# Patient Record
Sex: FEMALE | Race: WHITE | NOT HISPANIC OR LATINO | Employment: OTHER | URBAN - NONMETROPOLITAN AREA
[De-identification: names, ages, dates, MRNs, and addresses within clinical notes are randomized per-mention and may not be internally consistent; named-entity substitution may affect disease eponyms.]

---

## 2017-06-05 ENCOUNTER — APPOINTMENT (OUTPATIENT)
Dept: GENERAL RADIOLOGY | Facility: HOSPITAL | Age: 57
End: 2017-06-05

## 2017-06-05 ENCOUNTER — HOSPITAL ENCOUNTER (EMERGENCY)
Facility: HOSPITAL | Age: 57
Discharge: HOME | End: 2017-06-05
Attending: EMERGENCY MEDICINE | Admitting: EMERGENCY MEDICINE

## 2017-06-05 VITALS
DIASTOLIC BLOOD PRESSURE: 68 MMHG | RESPIRATION RATE: 16 BRPM | BODY MASS INDEX: 28.17 KG/M2 | OXYGEN SATURATION: 99 % | HEART RATE: 89 BPM | WEIGHT: 165 LBS | HEIGHT: 64 IN | SYSTOLIC BLOOD PRESSURE: 102 MMHG | TEMPERATURE: 97.6 F

## 2017-06-05 DIAGNOSIS — I47.1 PAROXYSMAL SVT (SUPRAVENTRICULAR TACHYCARDIA) (HCC): Primary | ICD-10-CM

## 2017-06-05 LAB
ALBUMIN SERPL-MCNC: 4.3 G/DL (ref 3.4–4.8)
ALBUMIN/GLOB SERPL: 1.2 G/DL (ref 1.1–1.8)
ALP SERPL-CCNC: 85 U/L (ref 38–126)
ALT SERPL W P-5'-P-CCNC: 41 U/L (ref 9–52)
ANION GAP SERPL CALCULATED.3IONS-SCNC: 12 MMOL/L (ref 5–15)
AST SERPL-CCNC: 32 U/L (ref 14–36)
BASOPHILS # BLD AUTO: 0.07 10*3/MM3 (ref 0–0.2)
BASOPHILS NFR BLD AUTO: 0.9 % (ref 0–2)
BILIRUB SERPL-MCNC: 0.4 MG/DL (ref 0.2–1.3)
BUN BLD-MCNC: 11 MG/DL (ref 7–21)
BUN/CREAT SERPL: 14.7 (ref 7–25)
CALCIUM SPEC-SCNC: 9.5 MG/DL (ref 8.4–10.2)
CHLORIDE SERPL-SCNC: 100 MMOL/L (ref 95–110)
CK MB SERPL-CCNC: 1 NG/ML (ref 0–5)
CK SERPL-CCNC: 59 U/L (ref 30–135)
CO2 SERPL-SCNC: 26 MMOL/L (ref 22–31)
CREAT BLD-MCNC: 0.75 MG/DL (ref 0.5–1)
DEPRECATED RDW RBC AUTO: 44.2 FL (ref 36.4–46.3)
EOSINOPHIL # BLD AUTO: 0.28 10*3/MM3 (ref 0–0.7)
EOSINOPHIL NFR BLD AUTO: 3.6 % (ref 0–7)
ERYTHROCYTE [DISTWIDTH] IN BLOOD BY AUTOMATED COUNT: 13.1 % (ref 11.5–14.5)
GFR SERPL CREATININE-BSD FRML MDRD: 79 ML/MIN/1.73 (ref 51–120)
GLOBULIN UR ELPH-MCNC: 3.5 GM/DL (ref 2.3–3.5)
GLUCOSE BLD-MCNC: 133 MG/DL (ref 60–100)
HCT VFR BLD AUTO: 40.3 % (ref 35–45)
HGB BLD-MCNC: 13.3 G/DL (ref 12–15.5)
IMM GRANULOCYTES # BLD: 0.03 10*3/MM3 (ref 0–0.02)
IMM GRANULOCYTES NFR BLD: 0.4 % (ref 0–0.5)
LYMPHOCYTES # BLD AUTO: 2.5 10*3/MM3 (ref 0.6–4.2)
LYMPHOCYTES NFR BLD AUTO: 32.2 % (ref 10–50)
MCH RBC QN AUTO: 30 PG (ref 26.5–34)
MCHC RBC AUTO-ENTMCNC: 33 G/DL (ref 31.4–36)
MCV RBC AUTO: 91 FL (ref 80–98)
MONOCYTES # BLD AUTO: 0.55 10*3/MM3 (ref 0–0.9)
MONOCYTES NFR BLD AUTO: 7.1 % (ref 0–12)
NEUTROPHILS # BLD AUTO: 4.33 10*3/MM3 (ref 2–8.6)
NEUTROPHILS NFR BLD AUTO: 55.8 % (ref 37–80)
NRBC BLD MANUAL-RTO: 0 /100 WBC (ref 0–0)
PLATELET # BLD AUTO: 303 10*3/MM3 (ref 150–450)
PMV BLD AUTO: 10.2 FL (ref 8–12)
POTASSIUM BLD-SCNC: 4.2 MMOL/L (ref 3.5–5.1)
PROT SERPL-MCNC: 7.8 G/DL (ref 6.3–8.6)
RBC # BLD AUTO: 4.43 10*6/MM3 (ref 3.77–5.16)
SODIUM BLD-SCNC: 138 MMOL/L (ref 137–145)
T4 FREE SERPL-MCNC: 1.06 NG/DL (ref 0.78–2.19)
TROPONIN I SERPL-MCNC: <0.012 NG/ML
TSH SERPL DL<=0.05 MIU/L-ACNC: 1.7 MIU/ML (ref 0.46–4.68)
WBC NRBC COR # BLD: 7.76 10*3/MM3 (ref 3.2–9.8)
WHOLE BLOOD HOLD SPECIMEN: NORMAL

## 2017-06-05 PROCEDURE — 99284 EMERGENCY DEPT VISIT MOD MDM: CPT

## 2017-06-05 PROCEDURE — 85025 COMPLETE CBC W/AUTO DIFF WBC: CPT | Performed by: EMERGENCY MEDICINE

## 2017-06-05 PROCEDURE — 82553 CREATINE MB FRACTION: CPT | Performed by: EMERGENCY MEDICINE

## 2017-06-05 PROCEDURE — 93010 ELECTROCARDIOGRAM REPORT: CPT | Performed by: INTERNAL MEDICINE

## 2017-06-05 PROCEDURE — 96374 THER/PROPH/DIAG INJ IV PUSH: CPT

## 2017-06-05 PROCEDURE — 80053 COMPREHEN METABOLIC PANEL: CPT | Performed by: EMERGENCY MEDICINE

## 2017-06-05 PROCEDURE — 84484 ASSAY OF TROPONIN QUANT: CPT | Performed by: EMERGENCY MEDICINE

## 2017-06-05 PROCEDURE — 84439 ASSAY OF FREE THYROXINE: CPT | Performed by: EMERGENCY MEDICINE

## 2017-06-05 PROCEDURE — 84443 ASSAY THYROID STIM HORMONE: CPT | Performed by: EMERGENCY MEDICINE

## 2017-06-05 PROCEDURE — 71020 HC CHEST PA AND LATERAL: CPT

## 2017-06-05 PROCEDURE — 82550 ASSAY OF CK (CPK): CPT | Performed by: EMERGENCY MEDICINE

## 2017-06-05 PROCEDURE — 93005 ELECTROCARDIOGRAM TRACING: CPT | Performed by: EMERGENCY MEDICINE

## 2017-06-05 RX ORDER — PRAVASTATIN SODIUM 20 MG
20 TABLET ORAL NIGHTLY
Status: ON HOLD | COMMUNITY
End: 2019-09-23 | Stop reason: ALTCHOICE

## 2017-06-05 RX ORDER — ASPIRIN 81 MG/1
81 TABLET, CHEWABLE ORAL DAILY
COMMUNITY

## 2017-06-05 RX ORDER — DILTIAZEM HYDROCHLORIDE 5 MG/ML
10 INJECTION INTRAVENOUS ONCE
Status: COMPLETED | OUTPATIENT
Start: 2017-06-05 | End: 2017-06-05

## 2017-06-05 RX ADMIN — DILTIAZEM HYDROCHLORIDE 10 MG: 5 INJECTION INTRAVENOUS at 14:31

## 2017-06-05 NOTE — ED TRIAGE NOTES
Arrival per EMS, was sitting at her desk today and felt her heart rate, nauseated and diaphoretic. This started around 1015 today. She reports that this is not new but hasn't seen anyone for her fast heart rate. On arrival to ED, she was asymptomatic, no longer exhibits symptoms and rate 108 bpm. She is calm, alert and oriented.

## 2017-06-05 NOTE — DISCHARGE INSTRUCTIONS
Followup in Dr. Morgan's clinic on Wednesday for further evaluation.  Return with any new or worsening symptoms, or any concerns.

## 2017-06-05 NOTE — ED PROVIDER NOTES
Subjective   HPI Comments: Patient presents with hx of tachycardia for 2 hours before arrival.  Patient notes she has these episodes about weekly, though sometimes monthly.  Notes episode duration from 30 minutes to 3 hours.  Today about 2 hours.  Patient noted chest tightness with the episodes.  No chest pain.  No prior cardiac workup.  EMS strip shows evidence of SVT, patient had converted by arrival.        History provided by:  Patient      Review of Systems   Constitutional: Negative.  Negative for appetite change, chills and fever.   HENT: Negative.  Negative for congestion.    Eyes: Negative.  Negative for photophobia and visual disturbance.   Respiratory: Negative.  Negative for cough, chest tightness and shortness of breath.    Cardiovascular: Negative.  Negative for chest pain and palpitations.   Gastrointestinal: Negative.  Negative for abdominal pain, constipation, diarrhea, nausea and vomiting.   Endocrine: Negative.    Genitourinary: Negative.  Negative for decreased urine volume, dysuria, flank pain and hematuria.   Musculoskeletal: Negative.  Negative for arthralgias, back pain, myalgias, neck pain and neck stiffness.   Skin: Negative.  Negative for pallor.   Neurological: Negative.  Negative for dizziness, syncope, weakness, light-headedness, numbness and headaches.   Psychiatric/Behavioral: Negative.  Negative for confusion and suicidal ideas. The patient is not nervous/anxious.    All other systems reviewed and are negative.      Past Medical History:   Diagnosis Date   • Diabetes mellitus    • Hyperlipidemia        No Known Allergies    Past Surgical History:   Procedure Laterality Date   •  SECTION         History reviewed. No pertinent family history.    Social History     Social History   • Marital status:      Spouse name: N/A   • Number of children: N/A   • Years of education: N/A     Social History Main Topics   • Smoking status: Never Smoker   • Smokeless tobacco: Never Used    • Alcohol use No   • Drug use: No   • Sexual activity: Defer     Other Topics Concern   • None     Social History Narrative   • None           Objective   Physical Exam   Constitutional: She is oriented to person, place, and time. She appears well-developed and well-nourished. No distress.   HENT:   Head: Normocephalic and atraumatic.   Nose: Nose normal.   Mouth/Throat: Oropharynx is clear and moist.   Eyes: Conjunctivae and EOM are normal. No scleral icterus.   Neck: Normal range of motion. Neck supple. No JVD present.   Cardiovascular: Regular rhythm, normal heart sounds and intact distal pulses.  Exam reveals no gallop and no friction rub.    No murmur heard.  Sinus tachycardia   Pulmonary/Chest: Effort normal. No respiratory distress. She has no wheezes. She has no rales. She exhibits no tenderness.   Abdominal: Soft. She exhibits no distension and no mass. There is no tenderness. There is no rebound and no guarding.   Musculoskeletal: Normal range of motion. She exhibits no edema, tenderness or deformity.   Lymphadenopathy:     She has no cervical adenopathy.   Neurological: She is alert and oriented to person, place, and time. No cranial nerve deficit. She exhibits normal muscle tone.   Skin: Skin is warm and dry. No rash noted. She is not diaphoretic. No erythema. No pallor.   Psychiatric: She has a normal mood and affect. Her behavior is normal. Judgment and thought content normal.   Nursing note and vitals reviewed.      Procedures         ED Course  ED Course      Labs Reviewed   COMPREHENSIVE METABOLIC PANEL - Abnormal; Notable for the following:        Result Value    Glucose 133 (*)     All other components within normal limits   CBC WITH AUTO DIFFERENTIAL - Abnormal; Notable for the following:     Immature Grans, Absolute 0.03 (*)     All other components within normal limits   CK - Normal   CK MB - Normal   TROPONIN (IN-HOUSE) - Normal   TSH - Normal   T4, FREE - Normal   CBC AND DIFFERENTIAL     Narrative:     The following orders were created for panel order CBC & Differential.  Procedure                               Abnormality         Status                     ---------                               -----------         ------                     CBC Auto Differential[[id number]]        Abnormal            Final result                 Please view results for these tests on the individual orders.   EXTRA TUBES    Narrative:     The following orders were created for panel order Extra Tubes.  Procedure                               Abnormality         Status                     ---------                               -----------         ------                     Light Blue Top[[id number]]                                   Final result                 Please view results for these tests on the individual orders.   LIGHT BLUE TOP       XR Chest 2 View   Final Result   CONCLUSION: No evidence of active disease.      Electronically signed by:  Jaycob Gil MD  6/5/2017 1:23 PM CDT   Workstation: [id number]        Patient continues with no evidence of SVT in the department, though EKG strip evidence of the same. Case discussed with Dr. Morgan for followup for possible medical vrs. Ablative therapy.  Patient and family verbalize understanding of the plan.                [facility]    Final diagnoses:   Paroxysmal SVT (supraventricular tachycardia)            Everette Trujillo MD  06/05/17 [id number]

## 2017-06-30 ENCOUNTER — OFFICE VISIT (OUTPATIENT)
Dept: CARDIOLOGY | Facility: CLINIC | Age: 57
End: 2017-06-30

## 2017-06-30 VITALS
WEIGHT: 168 LBS | HEART RATE: 74 BPM | DIASTOLIC BLOOD PRESSURE: 72 MMHG | BODY MASS INDEX: 28.68 KG/M2 | SYSTOLIC BLOOD PRESSURE: 118 MMHG | HEIGHT: 64 IN

## 2017-06-30 DIAGNOSIS — M72.2 PLANTAR FASCIITIS, LEFT: Primary | ICD-10-CM

## 2017-06-30 DIAGNOSIS — R00.2 PALPITATION: Primary | ICD-10-CM

## 2017-06-30 DIAGNOSIS — I47.1 SVT (SUPRAVENTRICULAR TACHYCARDIA) (HCC): ICD-10-CM

## 2017-06-30 DIAGNOSIS — E11.9 TYPE 2 DIABETES MELLITUS WITHOUT COMPLICATION, WITHOUT LONG-TERM CURRENT USE OF INSULIN (HCC): ICD-10-CM

## 2017-06-30 DIAGNOSIS — E78.5 HYPERLIPIDEMIA, UNSPECIFIED HYPERLIPIDEMIA TYPE: ICD-10-CM

## 2017-06-30 PROBLEM — I47.10 SVT (SUPRAVENTRICULAR TACHYCARDIA): Status: ACTIVE | Noted: 2017-06-30

## 2017-06-30 PROCEDURE — 99204 OFFICE O/P NEW MOD 45 MIN: CPT | Performed by: INTERNAL MEDICINE

## 2017-06-30 RX ORDER — DILTIAZEM HYDROCHLORIDE 120 MG/1
120 CAPSULE, COATED, EXTENDED RELEASE ORAL DAILY
Qty: 30 CAPSULE | Refills: 11 | Status: SHIPPED | OUTPATIENT
Start: 2017-06-30 | End: 2018-01-22 | Stop reason: SDUPTHER

## 2017-06-30 NOTE — PROGRESS NOTES
Mary Borges  57 y.o. female    2017  1. Palpitation    2. SVT (supraventricular tachycardia)    3. Hyperlipidemia, unspecified hyperlipidemia type    4. Type 2 diabetes mellitus without complication, without long-term current use of insulin        History of Present Illness:To evaluate for palpitation documented supraventricular tachycardia    57 years old patient with the more than 10-15 the year history of palpitation late increase in frequency and duration.  The patient admitted and evaluated at [facility] ER.  Fortunately she is (he converted to sinus rhythm.  On the rhythm strip showed documented supraventricular tachycardia with a heart rate up to 1 70 bpm.  She denies symptom of lightheaded and dizziness main symptom during tachycardia and shortness of breath.  Her other medical history included diabetes and hyperlipidemia and complaining of plantar fasciitis.  No syncope or near syncopal episode reported.          SUBJECTIVE    No Known Allergies      Past Medical History:   Diagnosis Date   • Diabetes mellitus    • Hyperlipidemia          Past Surgical History:   Procedure Laterality Date   •  SECTION           No family history on file.      Social History     Social History   • Marital status:      Spouse name: N/A   • Number of children: N/A   • Years of education: N/A     Occupational History   • Not on file.     Social History Main Topics   • Smoking status: Never Smoker   • Smokeless tobacco: Never Used   • Alcohol use No   • Drug use: No   • Sexual activity: Defer     Other Topics Concern   • Not on file     Social History Narrative         Current Outpatient Prescriptions   Medication Sig Dispense Refill   • aspirin 81 MG chewable tablet Chew 81 mg Daily.     • metFORMIN (GLUCOPHAGE) 500 MG tablet Take 500 mg by mouth 2 (Two) Times a Day With Meals.     • Phenazopyridine HCl (AZO TABS PO) Take  by mouth Daily.     • pravastatin (PRAVACHOL) 20 MG tablet Take 20 mg  "by mouth Every Night.       No current facility-administered medications for this visit.          OBJECTIVE    /72  Pulse 74  Ht 64\" (162.6 cm)  Wt 168 lb (76.2 kg)  BMI 28.84 kg/m2        Review of Systems     Constitutional:  Denies recent weight loss, weight gain, fever or chills, no change in exercise tolerance     HENT:  Denies any hearing loss, epistaxis, hoarseness, or difficulty speaking.     Eyes: No blurring     Respiratory:  Denies dyspnea with exertion,no cough, wheezing, or hemoptysis.     Cardiovascular: See H&P    Gastrointestinal:  Denies change in bowel habits, dyspepsia, ulcer disease, hematochezia, or melena.     Endocrine: Negative for cold intolerance, heat intolerance, polydipsia, polyphagia and polyuria. Denies any history of weight change, heat/cold intolerance, polydipsia, polyuria     Genitourinary: Negative.      Musculoskeletal: Denies any history of arthritic symptoms or back problems     Skin:  Denies any change in hair or nails, rashes, or skin lesions.     Allergic/Immunologic: Negative.  Negative for environmental allergies, food allergies and immunocompromised state.     Neurological:  Denies any history of recurrent headaches, strokes, TIA, or seizure disorder.     Hematological: Denies any food allergies, seasonal allergies, bleeding disorders, or lymphadenopathy.     Psychiatric/Behavioral: Denies any history of depression, substance abuse, or change in cognitive function.         Physical Exam     Constitutional: Cooperative, alert and oriented, well-developed, well-nourished, in no acute distress.     HENT:   Head: Normocephalic, normal hair patterns, no masses or tenderness.  Ears, Nose, and Throat: No gross abnormalities. No pallor or cyanosis. Dentition good.   Eyes: EOMS intact, PERRL, conjunctivae and lids unremarkable. Fundoscopic exam and visual fields not performed.   Neck: No palpable masses or adenopathy, no thyromegaly, no JVD, carotid pulses are full and " equal bilaterally and without  Bruits.     Cardiovascular: Regular rhythm, S1 and S2 normal, no S3 or S4. Apical impulse not displaced. No murmurs, gallops, or rubs detected.     Pulmonary/Chest: Chest: normal symmetry, no tenderness to palpation, normal respiratory excursion, no intercostal retraction, no use of accessory muscles.            Pulmonary: Normal breath sounds. No rales or ronchi.    Abdominal: Abdomen soft, bowel sounds normoactive, no masses, no hepatosplenomegaly, non-tender, no bruits.     Musculoskeletal: No deformities, clubbing, cyanosis, erythema, or edema observed. There are no spinal abnormalities noted. Normal muscle strength and tone. Pulses full and equal in all extremities, no bruits auscultated.     Neurological: No gross motor or sensory deficits noted, affect appropriate, oriented to time, person, place.     Skin: Warm and dry to the touch, no apparent skin lesions or masses noted.     Psychiatric: She has a normal mood and affect. Her behavior is normal. Judgment and thought content normal.         Procedures      Lab Results   Component Value Date    WBC 7.76 06/05/2017    HGB 13.3 06/05/2017    HCT 40.3 06/05/2017    MCV 91.0 06/05/2017     06/05/2017     Lab Results   Component Value Date    GLUCOSE 133 (H) 06/05/2017    BUN 11 06/05/2017    CREATININE 0.75 06/05/2017    EGFRIFNONA 79 06/05/2017    BCR 14.7 06/05/2017    CO2 26.0 06/05/2017    CALCIUM 9.5 06/05/2017    ALBUMIN 4.30 06/05/2017    LABIL2 1.2 06/05/2017    AST 32 06/05/2017    ALT 41 06/05/2017     No results found for: CHOL  No results found for: TRIG  No results found for: HDL  No results found for: LDLCALC  No results found for: LDL  No results found for: HDLLDLRATIO  No components found for: CHOLHDL  No results found for: HGBA1C  Lab Results   Component Value Date    TSH 1.700 06/05/2017           ASSESSMENT AND PLAN  #1 simple ventricle tachycardia #2 palpitation #3 hyperlipidemia #4 type 2  diabetes    Clinically there is no sign of any cardiac decompensation based on the clinical history physical finding.  Multiple different options discussed with the patient and the family regarding the management of supraventricular tachycardia.  Option #1 simple medical management #2 and arrhythmia medication #3 EP study and ablations.  Pros and cons of each option discussed with the patient and the family.  They opted for simple medical management.  We'll start the patient Cardizem at a dose of 120 mg prescription refill was given to the patient.  We'll arrange an echocardiographic study to assess the left and a systolic function.  We will see her back in 6 month R depends on patient clinical conditions.  She will continue metformin with history diabetes and pravastatin for management of hyperlipidemia.  will patient to podiatrist.    Diagnoses and all orders for this visit:    Palpitation    SVT (supraventricular tachycardia)    Hyperlipidemia, unspecified hyperlipidemia type    Type 2 diabetes mellitus without complication, without long-term current use of insulin        Sony Morgan MD  6/30/2017  11:34 AM

## 2017-07-14 ENCOUNTER — OFFICE VISIT (OUTPATIENT)
Dept: PODIATRY | Facility: CLINIC | Age: 57
End: 2017-07-14

## 2017-07-14 VITALS — HEIGHT: 64 IN | BODY MASS INDEX: 28.68 KG/M2 | WEIGHT: 168 LBS

## 2017-07-14 DIAGNOSIS — M79.672 LEFT FOOT PAIN: Primary | ICD-10-CM

## 2017-07-14 DIAGNOSIS — M72.2 PLANTAR FASCIITIS: ICD-10-CM

## 2017-07-14 PROCEDURE — 99203 OFFICE O/P NEW LOW 30 MIN: CPT | Performed by: PODIATRIST

## 2017-07-14 RX ORDER — MELOXICAM 7.5 MG/1
7.5 TABLET ORAL DAILY
Qty: 30 TABLET | Refills: 0 | Status: SHIPPED | OUTPATIENT
Start: 2017-07-14 | End: 2019-02-05

## 2017-07-14 NOTE — PROGRESS NOTES
"Mary Borges  1960  57 y.o. female  Patient presents today with a complaint of left heel pain.  2017  Chief Complaint   Patient presents with   • Left Foot - Pain           History of Present Illness    57-year-old female presents to clinic today with chief complaint of left foot pain.  Pain is located to the bottom of her heel.  Pain has been present for approximately 3 months.  She denies any injuries to the foot.  States the pain is worse with the first step in the morning or after periods of rest.  Currently she rates the pain as an 8 out of 10 and describes it as sharp.  She has tried nothing to treat the pain.  She has no other pedal complaints.        Past Medical History:   Diagnosis Date   • Diabetes mellitus    • Hyperlipidemia          Past Surgical History:   Procedure Laterality Date   •  SECTION           No family history on file.      Social History     Social History   • Marital status:      Spouse name: N/A   • Number of children: N/A   • Years of education: N/A     Occupational History   • Not on file.     Social History Main Topics   • Smoking status: Never Smoker   • Smokeless tobacco: Never Used   • Alcohol use No   • Drug use: No   • Sexual activity: Defer     Other Topics Concern   • Not on file     Social History Narrative         Current Outpatient Prescriptions   Medication Sig Dispense Refill   • aspirin 81 MG chewable tablet Chew 81 mg Daily.     • diltiaZEM CD (CARDIZEM CD) 120 MG 24 hr capsule Take 1 capsule by mouth Daily. 30 capsule 11   • metFORMIN (GLUCOPHAGE) 500 MG tablet Take 500 mg by mouth 2 (Two) Times a Day With Meals.     • Phenazopyridine HCl (AZO TABS PO) Take  by mouth Daily.     • pravastatin (PRAVACHOL) 20 MG tablet Take 20 mg by mouth Every Night.     • meloxicam (MOBIC) 7.5 MG tablet Take 1 tablet by mouth Daily. Take once daily. 30 tablet 0     No current facility-administered medications for this visit.          OBJECTIVE    Ht 64\" (162.6 " cm)  Wt 168 lb (76.2 kg)  BMI 28.84 kg/m2      Review of Systems   Constitutional: Negative for chills and fever.   Cardiovascular: Negative for chest pain.   Gastrointestinal: Negative for constipation, diarrhea, nausea and vomiting.   Skin: Negative for wound.   Musculoskeletal:Left foot pain      Constitutional: well developed, well nourished    HEENT: Normocephalic and atraumatic, normal hearing    Respiratory: Non labored respirations noted    Cardiovascular:    DP/PT pulses palpable    CFT brisk  to all digits  Skin temp is warm to warm from proximal tibia to distal digits  Pedal hair growth present.   No erythema or edema noted     Musculoskeletal:  Muscle strength is 5/5 for all muscle groups tested   ROM of the 1st MTP is full without pain or crepitus  ROM of the MTJ is full without pain or crepitus    ROM of the STJ is full without pain or crepitus    ROM of the ankle joint is full without pain or crepitus    Pain on palpation to the medial tubercle of the left calcaneus.  Negative lateral squeeze test.  Rectus foot type     Dermatological:   Nails 1-5 are within normal limits for length and thickness    Skin is warm, dry and intact    Webspaces 1-4 bilateral are clean, dry and intact.   No subcutaneous nodules or masses noted    No open wounds noted     Neurological:   Protective sensation intact    Sensation intact to light touch    DTR intact    Psychiatric: A&O x 3 with normal mood and affect. NAD.     Radiographs: 3 views the left foot were obtained today.  No acute osseous abnormalities are noted.  Calcaneal spurring noted.        Procedures        ASSESSMENT AND PLAN    Mary was seen today for pain.    Diagnoses and all orders for this visit:    Left foot pain  -     XR Foot 3+ View Left    Plantar fasciitis    Other orders  -     meloxicam (MOBIC) 7.5 MG tablet; Take 1 tablet by mouth Daily. Take once daily.    - Comprehensive foot and ankle exam performed  - X-rays taken and reviewed  - Rx for  mobic  - Patient advised to perform stretching exercises, icing and to make appropriate shoe gear changes to include wearing athletic type shoes with supportive insoles.  No bare foot walking.  Patient also given written instructions on how to correctly perform the stretching of the Achilles tendon/calf stretches, and the heel spur/plantar fasciitis regimen.  - Recommended over-the-counter insole such as power steps to properly support the arch in order to alleviate the tension in stress on the plantar fascia associated with normal daily walking. Patient was educated on the break-in period for new orthotics.  - Patient is in agreement with the current treatment plan.  All  questions were answered to their satisfaction.  - RTC in 4 weeks            This document has been electronically signed by Adan Jackson DPM on July 14, 2017 10:47 AM     7/14/2017  10:47 AM    EMR Dragon/Transcription disclaimer:   Much of this encounter note is an electronic transcription/translation of spoken language to printed text. The electronic translation of spoken language may permit erroneous, or at times, nonsensical words or phrases to be inadvertently transcribed; Although I have reviewed the note for such errors, some may still exist.

## 2017-08-11 ENCOUNTER — OFFICE VISIT (OUTPATIENT)
Dept: PODIATRY | Facility: CLINIC | Age: 57
End: 2017-08-11

## 2017-08-11 VITALS — BODY MASS INDEX: 28.68 KG/M2 | HEIGHT: 64 IN | WEIGHT: 168 LBS

## 2017-08-11 DIAGNOSIS — M72.2 PLANTAR FASCIITIS: Primary | ICD-10-CM

## 2017-08-11 PROCEDURE — 99213 OFFICE O/P EST LOW 20 MIN: CPT | Performed by: PODIATRIST

## 2017-08-11 NOTE — PROGRESS NOTES
"Mary Borges  1960  57 y.o. female  Patient presents today for a follow-up of left heel pain.    2017  Chief Complaint   Patient presents with   • Left Foot - Pain, Follow-up           History of Present Illness    Patient presents to me for follow-up of her left foot plantar fasciitis.  Her pain has improved from last visit.  She currently rates it as a 4 out of 10.  She is only stretching occasionally.  She is wearing her arch supports.  She has no new pedal complaints.      Past Medical History:   Diagnosis Date   • Diabetes mellitus    • Hyperlipidemia          Past Surgical History:   Procedure Laterality Date   •  SECTION           No family history on file.      Social History     Social History   • Marital status:      Spouse name: N/A   • Number of children: N/A   • Years of education: N/A     Occupational History   • Not on file.     Social History Main Topics   • Smoking status: Never Smoker   • Smokeless tobacco: Never Used   • Alcohol use No   • Drug use: No   • Sexual activity: Defer     Other Topics Concern   • Not on file     Social History Narrative         Current Outpatient Prescriptions   Medication Sig Dispense Refill   • aspirin 81 MG chewable tablet Chew 81 mg Daily.     • diltiaZEM CD (CARDIZEM CD) 120 MG 24 hr capsule Take 1 capsule by mouth Daily. 30 capsule 11   • meloxicam (MOBIC) 7.5 MG tablet Take 1 tablet by mouth Daily. Take once daily. 30 tablet 0   • metFORMIN (GLUCOPHAGE) 500 MG tablet Take 500 mg by mouth 2 (Two) Times a Day With Meals.     • Phenazopyridine HCl (AZO TABS PO) Take  by mouth Daily.     • pravastatin (PRAVACHOL) 20 MG tablet Take 20 mg by mouth Every Night.       No current facility-administered medications for this visit.          OBJECTIVE    Ht 64\" (162.6 cm)  Wt 168 lb (76.2 kg)  BMI 28.84 kg/m2      Review of Systems   Constitutional: Negative for chills and fever.   Cardiovascular: Negative for chest pain.   Gastrointestinal: " Negative for constipation, diarrhea, nausea and vomiting.   Skin: Negative for wound.   Musculoskeletal:Left foot pain      Constitutional: well developed, well nourished    HEENT: Normocephalic and atraumatic, normal hearing    Respiratory: Non labored respirations noted    Cardiovascular:    DP/PT pulses palpable    CFT brisk  to all digits  Skin temp is warm to warm from proximal tibia to distal digits  Pedal hair growth present.   No erythema or edema noted     Musculoskeletal:  Muscle strength is 5/5 for all muscle groups tested   ROM of the 1st MTP is full without pain or crepitus  ROM of the MTJ is full without pain or crepitus    ROM of the STJ is full without pain or crepitus    ROM of the ankle joint is full without pain or crepitus    Pain on palpation to the medial tubercle of the left calcaneus.  Negative lateral squeeze test.  Rectus foot type     Dermatological:   Nails 1-5 are within normal limits for length and thickness    Skin is warm, dry and intact    Webspaces 1-4 bilateral are clean, dry and intact.   No subcutaneous nodules or masses noted    No open wounds noted     Neurological:   Protective sensation intact    Sensation intact to light touch    DTR intact    Psychiatric: A&O x 3 with normal mood and affect. NAD.       Procedures        ASSESSMENT AND PLAN    Mary was seen today for pain and follow-up.    Diagnoses and all orders for this visit:    Plantar fasciitis    - Continue stretching, icing and are supports  - Recommended steroid injection to left heel however patient refuses at this time  - Advised patient to obtain a plantar fascial night splint  - Patient is in agreement with the current treatment plan.  All  questions were answered to their satisfaction.  - RTC in 2 months            This document has been electronically signed by Adan Jackson DPM on August 11, 2017 3:38 PM     8/11/2017  3:38 PM    EMR Dragon/Transcription disclaimer:   Much of this encounter note is an  electronic transcription/translation of spoken language to printed text. The electronic translation of spoken language may permit erroneous, or at times, nonsensical words or phrases to be inadvertently transcribed; Although I have reviewed the note for such errors, some may still exist.

## 2017-10-20 ENCOUNTER — OFFICE VISIT (OUTPATIENT)
Dept: PODIATRY | Facility: CLINIC | Age: 57
End: 2017-10-20

## 2017-10-20 VITALS — WEIGHT: 165 LBS | HEIGHT: 64 IN | BODY MASS INDEX: 28.17 KG/M2

## 2017-10-20 DIAGNOSIS — M72.2 PLANTAR FASCIITIS: Primary | ICD-10-CM

## 2017-10-20 PROCEDURE — 99213 OFFICE O/P EST LOW 20 MIN: CPT | Performed by: PODIATRIST

## 2017-10-20 NOTE — PROGRESS NOTES
Mary Borges  1960  57 y.o. female     Patient presents today for a follow-up of her left heel pain.    10/20/2017  Chief Complaint   Patient presents with   • Left Foot - Follow-up   • Plantar Fasciitis           History of Present Illness    Patient presents to me for follow-up of her left foot plantar fasciitis.   Her heel pain continues to improve.  She has obtained a night splint and is using regularly.  She rates her pain as a 2 out of 10 today.  She is still refusing steroid injection.  She has no new pedal complaints.    Past Medical History:   Diagnosis Date   • Diabetes mellitus    • Hyperlipidemia    • Plantar fasciitis          Past Surgical History:   Procedure Laterality Date   •  SECTION           Family History   Problem Relation Age of Onset   • Cancer Mother    • Heart disease Father    • Diabetes Father    • Diabetes Sister    • Cancer Maternal Grandmother          Social History     Social History   • Marital status:      Spouse name: N/A   • Number of children: N/A   • Years of education: N/A     Occupational History   • Not on file.     Social History Main Topics   • Smoking status: Never Smoker   • Smokeless tobacco: Never Used   • Alcohol use No   • Drug use: No   • Sexual activity: Defer     Other Topics Concern   • Not on file     Social History Narrative         Current Outpatient Prescriptions   Medication Sig Dispense Refill   • aspirin 81 MG chewable tablet Chew 81 mg Daily.     • diltiaZEM CD (CARDIZEM CD) 120 MG 24 hr capsule Take 1 capsule by mouth Daily. 30 capsule 11   • meloxicam (MOBIC) 7.5 MG tablet Take 1 tablet by mouth Daily. Take once daily. 30 tablet 0   • metFORMIN (GLUCOPHAGE) 500 MG tablet Take 500 mg by mouth 2 (Two) Times a Day With Meals.     • Phenazopyridine HCl (AZO TABS PO) Take  by mouth Daily.     • pravastatin (PRAVACHOL) 20 MG tablet Take 20 mg by mouth Every Night.       No current facility-administered medications for this visit.   "        OBJECTIVE    Ht 64\" (162.6 cm)  Wt 165 lb (74.8 kg)  BMI 28.32 kg/m2      Review of Systems   Constitutional: Negative for chills and fever.   Cardiovascular: Negative for chest pain.   Gastrointestinal: Negative for constipation, diarrhea, nausea and vomiting.   Skin: Negative for wound.   Musculoskeletal:Left foot pain      Constitutional: well developed, well nourished    HEENT: Normocephalic and atraumatic, normal hearing    Respiratory: Non labored respirations noted    Cardiovascular:    DP/PT pulses palpable    No erythema or edema noted     Musculoskeletal:  Muscle strength is 5/5 for all muscle groups tested    Mild Pain on palpation to the medial tubercle of the left calcaneus.  Negative lateral squeeze test.  Rectus foot type     Dermatological:   Nails 1-5 are within normal limits for length and thickness    No open wounds noted     Neurological:   Sensation intact to light touch      Psychiatric: A&O x 3 with normal mood and affect. NAD.       Procedures        ASSESSMENT AND PLAN    Mary was seen today for plantar fasciitis and follow-up.    Diagnoses and all orders for this visit:    Plantar fasciitis    - Continue stretching, icing and are supports  - All  questions were answered to their satisfaction.  - RTC if symptoms worsen or fail to improve            This document has been electronically signed by Adan Jackson DPM on October 20, 2017 9:09 AM     10/20/2017  9:09 AM    "

## 2017-12-26 RX ORDER — DILTIAZEM HYDROCHLORIDE 120 MG/1
TABLET, FILM COATED ORAL
Qty: 30 TABLET | Refills: 6 | Status: SHIPPED | OUTPATIENT
Start: 2017-12-26 | End: 2018-07-24 | Stop reason: SDUPTHER

## 2018-01-22 ENCOUNTER — OFFICE VISIT (OUTPATIENT)
Dept: CARDIOLOGY | Facility: CLINIC | Age: 58
End: 2018-01-22

## 2018-01-22 VITALS
SYSTOLIC BLOOD PRESSURE: 128 MMHG | DIASTOLIC BLOOD PRESSURE: 70 MMHG | HEART RATE: 75 BPM | HEIGHT: 64 IN | WEIGHT: 166 LBS | BODY MASS INDEX: 28.34 KG/M2

## 2018-01-22 DIAGNOSIS — R00.2 PALPITATION: ICD-10-CM

## 2018-01-22 DIAGNOSIS — I47.1 SVT (SUPRAVENTRICULAR TACHYCARDIA) (HCC): Primary | ICD-10-CM

## 2018-01-22 DIAGNOSIS — E78.5 HYPERLIPIDEMIA, UNSPECIFIED HYPERLIPIDEMIA TYPE: ICD-10-CM

## 2018-01-22 DIAGNOSIS — E11.9 TYPE 2 DIABETES MELLITUS WITHOUT COMPLICATION, WITHOUT LONG-TERM CURRENT USE OF INSULIN (HCC): ICD-10-CM

## 2018-01-22 PROCEDURE — 99213 OFFICE O/P EST LOW 20 MIN: CPT | Performed by: INTERNAL MEDICINE

## 2018-01-22 NOTE — PROGRESS NOTES
Mary Borges  58 y.o. female    2018  1. SVT (supraventricular tachycardia)    2. Palpitation    3. Hyperlipidemia, unspecified hyperlipidemia type    4. Type 2 diabetes mellitus without complication, without long-term current use of insulin        History of Present Illness:Routine follow-up    58 years old patient with the more than 10-15 the year history of palpitation lately increase in frequency and duration.  The patient admitted and evaluated at [facility].  Fortunately she is (he converted to sinus rhythm.  On the rhythm strip showed documented supraventricular tachycardia with a heart rate up to 1 70 bpm.  She denies symptom of lightheaded and dizziness main symptom during tachycardia and shortness of breath.  Her other medical history included diabetes and hyperlipidemia and complaining of plantar fasciitis. Patient had episode of palpitation and tachycardia and early part of 2017.  Patient want to continue present medication and doesn't want to consider EP study and ablation at this stage.        SUBJECTIVE    No Known Allergies      Past Medical History:   Diagnosis Date   • Diabetes mellitus    • Hyperlipidemia    • Plantar fasciitis          Past Surgical History:   Procedure Laterality Date   •  SECTION           Family History   Problem Relation Age of Onset   • Cancer Mother    • Heart disease Father    • Diabetes Father    • Diabetes Sister    • Cancer Maternal Grandmother          Social History     Social History   • Marital status:      Spouse name: N/A   • Number of children: N/A   • Years of education: N/A     Occupational History   • Not on file.     Social History Main Topics   • Smoking status: Never Smoker   • Smokeless tobacco: Never Used   • Alcohol use No   • Drug use: No   • Sexual activity: Defer     Other Topics Concern   • Not on file     Social History Narrative         Current Outpatient Prescriptions   Medication Sig Dispense Refill  "  • aspirin 81 MG chewable tablet Chew 81 mg Daily.     • diltiaZEM (CARDIZEM) 120 MG tablet TAKE 1 TABLET DAILY. 30 tablet 6   • meloxicam (MOBIC) 7.5 MG tablet Take 1 tablet by mouth Daily. Take once daily. 30 tablet 0   • metFORMIN (GLUCOPHAGE) 500 MG tablet Take 500 mg by mouth 2 (Two) Times a Day With Meals.     • Phenazopyridine HCl (AZO TABS PO) Take  by mouth Daily.     • pravastatin (PRAVACHOL) 20 MG tablet Take 20 mg by mouth Every Night.       No current facility-administered medications for this visit.          OBJECTIVE    /70  Pulse 75  Ht 162.6 cm (64.02\")  Wt 75.3 kg (166 lb)  BMI 28.48 kg/m2        Review of Systems     Constitutional:  Denies recent weight loss, weight gain, fever or chills, no change in exercise tolerance     HENT:  Denies any hearing loss, epistaxis, hoarseness, or difficulty speaking.     Eyes: Wears eyeglasses or contact lenses     Respiratory:  Denies dyspnea with exertion,no cough, wheezing, or hemoptysis.     Cardiovascular: Negative for palpations, chest pain, orthopnea, PND, peripheral edema, syncope, or claudication.     Gastrointestinal:  Denies change in bowel habits, dyspepsia, ulcer disease, hematochezia, or melena.     Endocrine: Negative for cold intolerance, heat intolerance, polydipsia, polyphagia and polyuria. Denies any history of weight change, heat/cold intolerance, polydipsia, polyuria     Genitourinary: Negative.      Musculoskeletal: Denies any history of arthritic symptoms or back problems     Skin:  Denies any change in hair or nails, rashes, or skin lesions.     Allergic/Immunologic: Negative.  Negative for environmental allergies, food allergies and immunocompromised state.     Neurological:  Denies any history of recurrent headaches, strokes, TIA, or seizure disorder.     Hematological: Denies any food allergies, seasonal allergies, bleeding disorders, or lymphadenopathy.     Psychiatric/Behavioral: Denies any history of depression, substance " abuse, or change in cognitive function.         Physical Exam     Constitutional: Cooperative, alert and oriented, well-developed, well-nourished, in no acute distress.     HENT:   Head: Normocephalic, normal hair patterns, no masses or tenderness.  Ears, Nose, and Throat: No gross abnormalities. No pallor or cyanosis. Dentition good.   Eyes: EOMS intact, PERRL, conjunctivae and lids unremarkable. Fundoscopic exam and visual fields not performed.   Neck: No palpable masses or adenopathy, no thyromegaly, no JVD, carotid pulses are full and equal bilaterally and without  Bruits.     Cardiovascular: Regular rhythm, S1 and S2 normal, no S3 or S4. Apical impulse not displaced. No murmurs, gallops, or rubs detected.     Pulmonary/Chest: Chest: normal symmetry, no tenderness to palpation, normal respiratory excursion, no intercostal retraction, no use of accessory muscles.            Pulmonary: Normal breath sounds. No rales or ronchi.    Abdominal: Abdomen soft, bowel sounds normoactive, no masses, no hepatosplenomegaly, non-tender, no bruits.     Musculoskeletal: No deformities, clubbing, cyanosis, erythema, or edema observed. There are no spinal abnormalities noted. Normal muscle strength and tone. Pulses full and equal in all extremities, no bruits auscultated.     Neurological: No gross motor or sensory deficits noted, affect appropriate, oriented to time, person, place.     Skin: Warm and dry to the touch, no apparent skin lesions or masses noted.     Psychiatric: She has a normal mood and affect. Her behavior is normal. Judgment and thought content normal.         Procedures      Lab Results   Component Value Date    WBC 7.76 06/05/2017    HGB 13.3 06/05/2017    HCT 40.3 06/05/2017    MCV 91.0 06/05/2017     06/05/2017     Lab Results   Component Value Date    GLUCOSE 133 (H) 06/05/2017    BUN 11 06/05/2017    CREATININE 0.75 06/05/2017    EGFRIFNONA 79 06/05/2017    BCR 14.7 06/05/2017    CO2 26.0 06/05/2017     CALCIUM 9.5 06/05/2017    ALBUMIN 4.30 06/05/2017    LABIL2 1.2 06/05/2017    AST 32 06/05/2017    ALT 41 06/05/2017     No results found for: CHOL  No results found for: TRIG  No results found for: HDL  No results found for: LDLCALC  No results found for: LDL  No results found for: HDLLDLRATIO  No components found for: CHOLHDL  No results found for: HGBA1C  Lab Results   Component Value Date    TSH 1.700 06/05/2017           ASSESSMENT AND PLAN    #1 simple ventricle tachycardia #2 palpitation #3 hyperlipidemia #4 type 2 diabetes     Clinically there is no sign of any cardiac decompensation based on the clinical history physical finding. Previously, Multiple different options discussed with the patient and the family regarding the management of supraventricular tachycardia.  Option #1 simple medical management #2 and arrhythmia medication #3 EP study and ablations.  Pros and cons of each option discussed with the patient and the family.  They opted for simple medical management. Started Cardizem at a dose of 120 mg. .  We will see her back in 6 month R depends on patient clinical conditions.  She will continue metformin with history diabetes and pravastatin for management of hyperlipidemia.  Mary was seen today for follow-up.    Diagnoses and all orders for this visit:    SVT (supraventricular tachycardia)    Palpitation    Hyperlipidemia, unspecified hyperlipidemia type    Type 2 diabetes mellitus without complication, without long-term current use of insulin        Sony Morgan MD  1/22/2018  10:23 AM

## 2018-07-24 RX ORDER — DILTIAZEM HYDROCHLORIDE 120 MG/1
TABLET, FILM COATED ORAL
Qty: 30 TABLET | Refills: 0 | Status: SHIPPED | OUTPATIENT
Start: 2018-07-24 | End: 2018-08-23 | Stop reason: SDUPTHER

## 2018-07-25 ENCOUNTER — OFFICE VISIT (OUTPATIENT)
Dept: CARDIOLOGY | Facility: CLINIC | Age: 58
End: 2018-07-25

## 2018-07-25 VITALS
WEIGHT: 166 LBS | DIASTOLIC BLOOD PRESSURE: 60 MMHG | SYSTOLIC BLOOD PRESSURE: 102 MMHG | HEIGHT: 64 IN | OXYGEN SATURATION: 96 % | HEART RATE: 75 BPM | BODY MASS INDEX: 28.34 KG/M2

## 2018-07-25 DIAGNOSIS — E78.5 HYPERLIPIDEMIA, UNSPECIFIED HYPERLIPIDEMIA TYPE: ICD-10-CM

## 2018-07-25 DIAGNOSIS — R00.2 PALPITATION: ICD-10-CM

## 2018-07-25 DIAGNOSIS — I47.1 SVT (SUPRAVENTRICULAR TACHYCARDIA) (HCC): Primary | ICD-10-CM

## 2018-07-25 DIAGNOSIS — E11.9 TYPE 2 DIABETES MELLITUS WITHOUT COMPLICATION, WITHOUT LONG-TERM CURRENT USE OF INSULIN (HCC): ICD-10-CM

## 2018-07-25 PROCEDURE — 99213 OFFICE O/P EST LOW 20 MIN: CPT | Performed by: INTERNAL MEDICINE

## 2018-07-25 NOTE — PROGRESS NOTES
Mary Borges  58 y.o. female    2018  1. SVT (supraventricular tachycardia) (CMS/[facility])    2. Palpitation    3. Hyperlipidemia, unspecified hyperlipidemia type    4. Type 2 diabetes mellitus without complication, without long-term current use of insulin (CMS/[facility])        History of Present Illness    58 years old patient with the more than 10-15 the year history of palpitation .  The patient admitted and evaluated at [facility] ER.  Fortunately she i converted to sinus rhythm.  On the rhythm strip showed documented supraventricular tachycardia with a heart rate up to 1 70 bpm.  She denies symptom of lightheaded and dizziness main symptom during tachycardia and shortness of breath.  Her other medical history included diabetes and hyperlipidemia and complaining of plantar fasciitis. Patient had episode of palpitation and tachycardia and early part of 2017.  Patient want to continue present medication and doesn't want to consider EP study and ablation at this stage.Her only complaint is a week and fatigability of the thigh muscle.  She is on statin Mevacor.  Asked the patient to hold the statin and see if there is any improvement right        SUBJECTIVE    No Known Allergies      Past Medical History:   Diagnosis Date   • Diabetes mellitus (CMS/HCC)    • Hyperlipidemia    • Plantar fasciitis          Past Surgical History:   Procedure Laterality Date   •  SECTION           Family History   Problem Relation Age of Onset   • Cancer Mother    • Heart disease Father    • Diabetes Father    • Diabetes Sister    • Cancer Maternal Grandmother          Social History     Social History   • Marital status:      Spouse name: N/A   • Number of children: N/A   • Years of education: N/A     Occupational History   • Not on file.     Social History Main Topics   • Smoking status: Never Smoker   • Smokeless tobacco: Never Used   • Alcohol use No   • Drug use: No   • Sexual activity: Defer  "    Other Topics Concern   • Not on file     Social History Narrative   • No narrative on file         Current Outpatient Prescriptions   Medication Sig Dispense Refill   • aspirin 81 MG chewable tablet Chew 81 mg Daily.     • diltiaZEM (CARDIZEM) 120 MG tablet TAKE 1 TABLET DAILY. 30 tablet 0   • meloxicam (MOBIC) 7.5 MG tablet Take 1 tablet by mouth Daily. Take once daily. 30 tablet 0   • metFORMIN (GLUCOPHAGE) 500 MG tablet Take 500 mg by mouth 2 (Two) Times a Day With Meals.     • Phenazopyridine HCl (AZO TABS PO) Take  by mouth Daily.     • pravastatin (PRAVACHOL) 20 MG tablet Take 20 mg by mouth Every Night.       No current facility-administered medications for this visit.          OBJECTIVE    /60   Pulse 75   Ht 162.6 cm (64.02\")   Wt 75.3 kg (166 lb)   SpO2 96%   BMI 28.48 kg/m²         Review of Systems     Constitutional:  Denies recent weight loss, weight gain, fever or chills, no change in exercise tolerance     HENT:  Denies any hearing loss, epistaxis, hoarseness, or difficulty speaking.     Eyes: Wears eyeglasses or contact lenses     Respiratory:  Denies dyspnea with exertion,no cough, wheezing, or hemoptysis.     Cardiovascular: Negative for palpations, chest pain, orthopnea, PND, peripheral edema, syncope, or claudication.     Gastrointestinal:  Denies change in bowel habits, dyspepsia, ulcer disease, hematochezia, or melena.     Endocrine: Negative for cold intolerance, heat intolerance, polydipsia, polyphagia and polyuria. Denies any history of weight change, heat/cold intolerance, polydipsia, polyuria     Genitourinary: Negative.      Musculoskeletal: Denies any history of arthritic symptoms or back problems     Skin:  Denies any change in hair or nails, rashes, or skin lesions.     Allergic/Immunologic: Negative.  Negative for environmental allergies, food allergies and immunocompromised state.     Neurological:  Denies any history of recurrent headaches, strokes, TIA, or seizure " disorder.     Hematological: Denies any food allergies, seasonal allergies, bleeding disorders, or lymphadenopathy.     Psychiatric/Behavioral: Denies any history of depression, substance abuse, or change in cognitive function.         Physical Exam     Constitutional: Cooperative, alert and oriented, well-developed, well-nourished, in no acute distress.     HENT:   Head: Normocephalic, normal hair patterns, no masses or tenderness.  Ears, Nose, and Throat: No gross abnormalities. No pallor or cyanosis. Dentition good.   Eyes: EOMS intact, PERRL, conjunctivae and lids unremarkable. Fundoscopic exam and visual fields not performed.   Neck: No palpable masses or adenopathy, no thyromegaly, no JVD, carotid pulses are full and equal bilaterally and without  Bruits.     Cardiovascular: Regular rhythm, S1 and S2 normal, no S3 or S4. Apical impulse not displaced. No murmurs, gallops, or rubs detected.     Pulmonary/Chest: Chest: normal symmetry, no tenderness to palpation, normal respiratory excursion, no intercostal retraction, no use of accessory muscles.            Pulmonary: Normal breath sounds. No rales or ronchi.    Abdominal: Abdomen soft, bowel sounds normoactive, no masses, no hepatosplenomegaly, non-tender, no bruits.     Musculoskeletal: No deformities, clubbing, cyanosis, erythema, or edema observed. There are no spinal abnormalities noted. Normal muscle strength and tone. Pulses full and equal in all extremities, no bruits auscultated.     Neurological: No gross motor or sensory deficits noted, affect appropriate, oriented to time, person, place.     Skin: Warm and dry to the touch, no apparent skin lesions or masses noted.     Psychiatric: She has a normal mood and affect. Her behavior is normal. Judgment and thought content normal.         Procedures      Lab Results   Component Value Date    WBC 7.76 06/05/2017    HGB 13.3 06/05/2017    HCT 40.3 06/05/2017    MCV 91.0 06/05/2017     06/05/2017      Lab Results   Component Value Date    GLUCOSE 133 (H) 06/05/2017    BUN 11 06/05/2017    CREATININE 0.75 06/05/2017    EGFRIFNONA 79 06/05/2017    BCR 14.7 06/05/2017    CO2 26.0 06/05/2017    CALCIUM 9.5 06/05/2017    ALBUMIN 4.30 06/05/2017    AST 32 06/05/2017    ALT 41 06/05/2017     No results found for: CHOL  No results found for: TRIG  No results found for: HDL  No components found for: LDLCALC  No results found for: LDL  No results found for: HDLLDLRATIO  No components found for: CHOLHDL  No results found for: HGBA1C  Lab Results   Component Value Date    TSH 1.700 06/05/2017           ASSESSMENT AND PLAN  #1 Supra ventricle tachycardia #2 palpitation #3 hyperlipidemia #4 type 2 diabetes     Clinically there is no sign of any cardiac decompensation based on the clinical history physical finding. Previously, Multiple different options discussed with the patient and the family regarding the management of supraventricular tachycardia.  Option #1 simple medical management #2 and arrhythmia medication #3 EP study and ablations.  Pros and cons of each option discussed with the patient and the family.  They opted for simple medical management. Started Cardizem at a dose of 120 mg. .  We will see her back in 6 month R depends on patient clinical conditions.  She will continue metformin with history diabetes and Will hold the pravastatin given the muscle fatigability and weakness.  If there is no improvement need further evaluated through the family care doctor    Mary was seen today for follow-up.    Diagnoses and all orders for this visit:    SVT (supraventricular tachycardia) (CMS/[facility])    Palpitation    Hyperlipidemia, unspecified hyperlipidemia type    Type 2 diabetes mellitus without complication, without long-term current use of insulin (CMS/[facility])        Sony Morgan MD  7/25/2018  11:19 AM

## 2018-08-23 RX ORDER — DILTIAZEM HYDROCHLORIDE 120 MG/1
TABLET, FILM COATED ORAL
Qty: 30 TABLET | Refills: 0 | Status: SHIPPED | OUTPATIENT
Start: 2018-08-23 | End: 2018-09-21 | Stop reason: SDUPTHER

## 2018-09-21 RX ORDER — DILTIAZEM HYDROCHLORIDE 120 MG/1
TABLET, FILM COATED ORAL
Qty: 30 TABLET | Refills: 6 | Status: SHIPPED | OUTPATIENT
Start: 2018-09-21 | End: 2019-03-22 | Stop reason: SDUPTHER

## 2019-02-04 DIAGNOSIS — R00.2 PALPITATION: Primary | ICD-10-CM

## 2019-02-05 ENCOUNTER — OFFICE VISIT (OUTPATIENT)
Dept: CARDIOLOGY | Facility: CLINIC | Age: 59
End: 2019-02-05

## 2019-02-05 VITALS
DIASTOLIC BLOOD PRESSURE: 64 MMHG | WEIGHT: 166 LBS | HEIGHT: 64 IN | BODY MASS INDEX: 28.34 KG/M2 | HEART RATE: 78 BPM | SYSTOLIC BLOOD PRESSURE: 126 MMHG

## 2019-02-05 DIAGNOSIS — I47.1 SVT (SUPRAVENTRICULAR TACHYCARDIA) (HCC): Primary | ICD-10-CM

## 2019-02-05 DIAGNOSIS — E11.9 TYPE 2 DIABETES MELLITUS WITHOUT COMPLICATION, WITHOUT LONG-TERM CURRENT USE OF INSULIN (HCC): ICD-10-CM

## 2019-02-05 DIAGNOSIS — R00.2 PALPITATION: ICD-10-CM

## 2019-02-05 DIAGNOSIS — E78.5 HYPERLIPIDEMIA, UNSPECIFIED HYPERLIPIDEMIA TYPE: ICD-10-CM

## 2019-02-05 PROCEDURE — 93000 ELECTROCARDIOGRAM COMPLETE: CPT | Performed by: INTERNAL MEDICINE

## 2019-02-05 PROCEDURE — 99213 OFFICE O/P EST LOW 20 MIN: CPT | Performed by: INTERNAL MEDICINE

## 2019-02-05 NOTE — PROGRESS NOTES
Mary Borges  59 y.o. female    2019  1. SVT (supraventricular tachycardia) (CMS/HCC)    2. Palpitation    3. Hyperlipidemia, unspecified hyperlipidemia type    4. Type 2 diabetes mellitus without complication, without long-term current use of insulin (CMS/HCC)        History of Present Illness:    Patient's Body mass index is 28.48 kg/m². BMI is above normal parameters. Recommendations include: exercise counseling, nutrition counseling and referral to primary care.     59 years old patient with  more than 10-15 the year history of palpitation .  The patient admitted and evaluated at [facility].  Fortunately she i converted to sinus rhythm.  On the rhythm strip showed documented supraventricular tachycardia with a heart rate up to 1 70 bpm.  She denies symptom of lightheaded and dizziness main symptom during tachycardia and shortness of breath.  Her other medical history included diabetes and hyperlipidemia and complaining of plantar fasciitis. Patient had episode of palpitation and tachycardia and early part of 2017.  Patient want to continue present medication and doesn't want to consider EP study and ablation at this stage.Her only complaint is a week and fatigability of the thigh muscle.  She is on statin Mevacor.  EKG in the office today sinus rhythm with nonspecific ST-T wave changes and poor R-wave progression          SUBJECTIVE:    No Known Allergies      Past Medical History:   Diagnosis Date   • Diabetes mellitus (CMS/HCC)    • Hyperlipidemia    • Plantar fasciitis          Past Surgical History:   Procedure Laterality Date   •  SECTION           Family History   Problem Relation Age of Onset   • Cancer Mother    • Heart disease Father    • Diabetes Father    • Diabetes Sister    • Cancer Maternal Grandmother          Social History     Socioeconomic History   • Marital status:      Spouse name: Not on file   • Number of children: Not on file   • Years of  education: Not on file   • Highest education level: Not on file   Social Needs   • Financial resource strain: Not on file   • Food insecurity - worry: Not on file   • Food insecurity - inability: Not on file   • Transportation needs - medical: Not on file   • Transportation needs - non-medical: Not on file   Occupational History   • Not on file   Tobacco Use   • Smoking status: Never Smoker   • Smokeless tobacco: Never Used   Substance and Sexual Activity   • Alcohol use: No   • Drug use: No   • Sexual activity: Defer   Other Topics Concern   • Not on file   Social History Narrative   • Not on file         Current Outpatient Medications   Medication Sig Dispense Refill   • aspirin 81 MG chewable tablet Chew 81 mg Daily.     • diltiaZEM (CARDIZEM) 120 MG tablet TAKE 1 TABLET DAILY. 30 tablet 6   • metFORMIN (GLUCOPHAGE) 500 MG tablet Take 500 mg by mouth 2 (Two) Times a Day With Meals.     • Multiple Vitamins-Minerals (CENTRUM SILVER 50+WOMEN PO) Take  by mouth.     • Phenazopyridine HCl (AZO TABS PO) Take  by mouth Daily.     • pravastatin (PRAVACHOL) 20 MG tablet Take 20 mg by mouth Every Night.       No current facility-administered medications for this visit.            Review of Systems:     Constitutional:  Denies recent weight loss, weight gain, fever or chills, no change in exercise tolerance.     HENT:  Denies any hearing loss, epistaxis, hoarseness, or difficulty speaking.     Eyes: Wears eyeglasses or contact lenses.    Respiratory:  Denies dyspnea with exertion,no cough, wheezing, or hemoptysis.     Cardiovascular: Negative for palpations, chest pain, orthopnea, PND, peripheral edema, syncope, or claudication.     Gastrointestinal:  Denies change in bowel habits, dyspepsia, ulcer disease, hematochezia, or melena.     Endocrine: Negative for cold intolerance, heat intolerance, polydipsia, polyphagia and polyuria. Denies any history of weight change, polydipsia, polyuria.     Genitourinary: Negative.     "  Musculoskeletal: Denies any history of arthritic symptoms or back problems.     Skin:  Denies any change in hair or nails, rashes, or skin lesions.     Allergic/Immunologic: Negative.  Negative for environmental allergies, food allergies and immunocompromised state.     Neurological:  Denies any history of recurrent headaches, strokes, TIA, or seizure disorder.     Hematological: Denies any food allergies, seasonal allergies, bleeding disorders, or lymphadenopathy.     Psychiatric/Behavioral: Denies any history of depression, substance abuse, or change in cognitive function.       OBJECTIVE:    /64   Pulse 78   Ht 162.6 cm (64.02\")   Wt 75.3 kg (166 lb)   BMI 28.48 kg/m²       Physical Exam:     Constitutional: Cooperative, alert and oriented, well-developed, well-nourished, in no acute distress.     HENT:   Head: Normocephalic, normal hair patterns, no masses or tenderness.  Ears, Nose, and Throat: No gross abnormalities. No pallor or cyanosis. Dentition good.   Eyes: EOMS intact, PERRL, conjunctivae and lids unremarkable. Fundoscopic exam and visual fields not performed.   Neck: No palpable masses or adenopathy, no thyromegaly, no JVD, carotid pulses are full and equal bilaterally and without  Bruits.     Cardiovascular: Regular rhythm, S1 and S2 normal, no S3 or S4. Apical impulse not displaced. No murmurs, gallops, or rubs detected.     Pulmonary/Chest: Chest: normal symmetry, no tenderness to palpation, normal respiratory excursion, no intercostal retraction, no use of accessory muscles. Pulmonary: Normal breath sounds. No rales or rhonchi.    Abdominal: Abdomen soft, bowel sounds normoactive, no masses, no hepatosplenomegaly, non-tender, no bruits.     Musculoskeletal: No deformities, clubbing, cyanosis, erythema, or edema observed. There are no spinal abnormalities noted. Normal muscle strength and tone. Pulses full and equal in all extremities, no bruits auscultated.     Neurological: No gross " motor or sensory deficits noted, affect appropriate, oriented to time, person, place.     Skin: Warm and dry to the touch, no apparent skin lesions or masses noted.     Psychiatric: She has a normal mood and affect. Her behavior is normal. Judgment and thought content normal.         Procedures      Lab Results   Component Value Date    WBC 7.76 06/05/2017    HGB 13.3 06/05/2017    HCT 40.3 06/05/2017    MCV 91.0 06/05/2017     06/05/2017     Lab Results   Component Value Date    GLUCOSE 133 (H) 06/05/2017    BUN 11 06/05/2017    CREATININE 0.75 06/05/2017    EGFRIFNONA 79 06/05/2017    BCR 14.7 06/05/2017    CO2 26.0 06/05/2017    CALCIUM 9.5 06/05/2017    ALBUMIN 4.30 06/05/2017    AST 32 06/05/2017    ALT 41 06/05/2017     No results found for: CHOL  No results found for: TRIG  No results found for: HDL  No components found for: LDLCALC  No results found for: LDL  No results found for: HDLLDLRATIO  No components found for: CHOLHDL  No results found for: HGBA1C  Lab Results   Component Value Date    TSH 1.700 06/05/2017           ASSESSMENT AND PLAN:  1 Supra ventricle tachycardia #2 palpitation #3 hyperlipidemia #4 type 2 diabetes     Clinically there is no sign of any cardiac decompensation based on the clinical history physical finding. Previously, Multiple different options discussed with the patient and the family regarding the management of supraventricular tachycardia.  Option #1 simple medical management #2 anti arrhythmic medication #3 EP study and ablations.  Pros and cons of each option discussed with the patient and the family.  They opted for simple medical management. Started Cardizem at a dose of 120 mg. .  We will see her back in 6 month R depends on patient clinical conditions.  She will continue metformin with history diabetes .  EKG in the office in sinus rhythm with a poor R-wave progression and nonspecific ST-T wave changes.  Given the history diabetes, hyperlipidemia and postmenopausal  status recommend to risk stratify with a plain treadmill stress test we will also arrange an echocardiographic study to assess the left ventricle systolic function.  Risk factor lifestyle modification discussed.  Significant of low carbohydrate, low-fat and dash diet explained        Mary was seen today for follow-up.    Diagnoses and all orders for this visit:    SVT (supraventricular tachycardia) (CMS/HCC)    Palpitation    Hyperlipidemia, unspecified hyperlipidemia type    Type 2 diabetes mellitus without complication, without long-term current use of insulin (CMS/HCC)        Sony Morgan MD  2/5/2019  10:30 AM

## 2019-03-22 RX ORDER — DILTIAZEM HYDROCHLORIDE 120 MG/1
TABLET, FILM COATED ORAL
Qty: 30 TABLET | Refills: 4 | Status: SHIPPED | OUTPATIENT
Start: 2019-03-22 | End: 2019-08-20 | Stop reason: SDUPTHER

## 2019-08-20 RX ORDER — DILTIAZEM HYDROCHLORIDE 120 MG/1
TABLET, FILM COATED ORAL
Qty: 30 TABLET | Refills: 6 | Status: SHIPPED | OUTPATIENT
Start: 2019-08-20 | End: 2019-09-20

## 2019-08-28 ENCOUNTER — OFFICE VISIT (OUTPATIENT)
Dept: CARDIOLOGY | Facility: CLINIC | Age: 59
End: 2019-08-28

## 2019-08-28 ENCOUNTER — DOCUMENTATION (OUTPATIENT)
Dept: CARDIOLOGY | Facility: CLINIC | Age: 59
End: 2019-08-28

## 2019-08-28 VITALS
SYSTOLIC BLOOD PRESSURE: 118 MMHG | OXYGEN SATURATION: 97 % | BODY MASS INDEX: 28.77 KG/M2 | DIASTOLIC BLOOD PRESSURE: 88 MMHG | WEIGHT: 168.5 LBS | HEART RATE: 78 BPM | HEIGHT: 64 IN

## 2019-08-28 DIAGNOSIS — E11.9 TYPE 2 DIABETES MELLITUS WITHOUT COMPLICATION, WITHOUT LONG-TERM CURRENT USE OF INSULIN (HCC): ICD-10-CM

## 2019-08-28 DIAGNOSIS — E78.5 HYPERLIPIDEMIA, UNSPECIFIED HYPERLIPIDEMIA TYPE: ICD-10-CM

## 2019-08-28 DIAGNOSIS — I47.1 SVT (SUPRAVENTRICULAR TACHYCARDIA) (HCC): Primary | ICD-10-CM

## 2019-08-28 DIAGNOSIS — R00.2 PALPITATION: ICD-10-CM

## 2019-08-28 PROCEDURE — 99215 OFFICE O/P EST HI 40 MIN: CPT | Performed by: INTERNAL MEDICINE

## 2019-08-28 NOTE — PROGRESS NOTES
SVT ablation discussed with patient. Instructions given. Patient to call with possible dates of procedure after she discusses with her boss at work. Understanding of instructions verbalized

## 2019-08-28 NOTE — PROGRESS NOTES
Mary Borges  59 y.o. female    2019  1. SVT (supraventricular tachycardia) (CMS/[facility])    2. Hyperlipidemia, unspecified hyperlipidemia type    3. Palpitation    4. Type 2 diabetes mellitus without complication, without long-term current use of insulin (CMS/HCC)        History of Present Illness:    Body mass index is 28.91 kg/m². BMI is above normal parameters. Recommendations include: exercise counseling, nutrition counseling and referral to primary care.        59 years old patient with  more than 10-15 the year history of palpitation recently evaluated at [facility] for supraventricular tachycardia lasting more than 2 hours responded to adenosine subsequently discharged and presented today to discuss the further options with the previous history of evaluation. at [facility] ER.  Fortunately she iconverted to sinus rhythm.  On the rhythm strip showed documented supraventricular tachycardia with a heart rate up to 1 70 bpm.  She denies symptom of lightheaded and dizziness main symptom during tachycardia and shortness of breath.  Her other medical history included diabetes and hyperlipidemia and complaining of plantar fasciitis. Patient had episode of palpitation and tachycardia and early part of 2017.      2019  · Estimated EF = 61%.  · Left ventricular systolic function is normal.  · The tricuspid valve is grossly normal. Trace tricuspid valve regurgitation is present. No evidence of pulmonary hypertension is present.  SUBJECTIVE:    No Known Allergies      Past Medical History:   Diagnosis Date   • Diabetes mellitus (CMS/HCC)    • Hyperlipidemia    • Plantar fasciitis          Past Surgical History:   Procedure Laterality Date   •  SECTION           Family History   Problem Relation Age of Onset   • Cancer Mother    • Heart disease Father    • Diabetes Father    • Diabetes Sister    • Cancer Maternal Grandmother          Social History     Socioeconomic History   •  Marital status:      Spouse name: Not on file   • Number of children: Not on file   • Years of education: Not on file   • Highest education level: Not on file   Tobacco Use   • Smoking status: Never Smoker   • Smokeless tobacco: Never Used   Substance and Sexual Activity   • Alcohol use: No   • Drug use: No   • Sexual activity: Defer         Current Outpatient Medications   Medication Sig Dispense Refill   • aspirin 81 MG chewable tablet Chew 81 mg Daily.     • diltiaZEM (CARDIZEM) 120 MG tablet TAKE 1 TABLET DAILY. 30 tablet 6   • metFORMIN (GLUCOPHAGE) 500 MG tablet Take 500 mg by mouth 2 (Two) Times a Day With Meals.     • Multiple Vitamins-Minerals (CENTRUM SILVER 50+WOMEN PO) Take  by mouth.     • Phenazopyridine HCl (AZO TABS PO) Take  by mouth Daily.     • pravastatin (PRAVACHOL) 20 MG tablet Take 20 mg by mouth Every Night.       No current facility-administered medications for this visit.            Review of Systems:     Constitutional:  Denies recent weight loss, weight gain, fever or chills, no change in exercise tolerance.     HENT:  Denies any hearing loss, epistaxis, hoarseness, or difficulty speaking.     Eyes: No blurred    Respiratory:  Denies dyspnea with exertion,no cough, wheezing, or hemoptysis.     Cardiovascular: See H&P    Gastrointestinal:  Denies change in bowel habits, dyspepsia, ulcer disease, hematochezia, or melena.     Endocrine: Negative for cold intolerance, heat intolerance, polydipsia, polyphagia and polyuria. Denies any history of weight change, polydipsia, polyuria.     Genitourinary: Negative.      Musculoskeletal: Denies any history of arthritic symptoms or back problems.     Skin:  Denies any change in hair or nails, rashes, or skin lesions.     Allergic/Immunologic: Negative.  Negative for environmental allergies, food allergies and immunocompromised state.     Neurological:  Denies any history of recurrent headaches, strokes, TIA, or seizure disorder.  "    Hematological: Denies any food allergies, seasonal allergies, bleeding disorders, or lymphadenopathy.     Psychiatric/Behavioral: Denies any history of depression, substance abuse, or change in cognitive function.       OBJECTIVE:    /88   Pulse 78   Ht 162.6 cm (64.02\")   Wt 76.4 kg (168 lb 8 oz)   SpO2 97%   BMI 28.91 kg/m²       Physical Exam:     Constitutional: Cooperative, alert and oriented, well-developed, well-nourished, in no acute distress.     HENT:   Head: Normocephalic, normal hair patterns, no masses or tenderness.  Ears, Nose, and Throat: No gross abnormalities. No pallor or cyanosis. Dentition good.   Eyes: EOMS intact, PERRL, conjunctivae and lids unremarkable. Fundoscopic exam and visual fields not performed.   Neck: No palpable masses or adenopathy, no thyromegaly, no JVD, carotid pulses are full and equal bilaterally and without  Bruits.     Cardiovascular: Regular rhythm, S1 and S2 normal, no S3 or S4. Apical impulse not displaced. No murmurs, gallops, or rubs detected.     Pulmonary/Chest: Chest: normal symmetry, no tenderness to palpation, normal respiratory excursion, no intercostal retraction, no use of accessory muscles. Pulmonary: Normal breath sounds. No rales or rhonchi.    Abdominal: Abdomen soft, bowel sounds normoactive, no masses, no hepatosplenomegaly, non-tender, no bruits.     Musculoskeletal: No deformities, clubbing, cyanosis, erythema, or edema observed. There are no spinal abnormalities noted. Normal muscle strength and tone. Pulses full and equal in all extremities, no bruits auscultated.     Neurological: No gross motor or sensory deficits noted, affect appropriate, oriented to time, person, place.     Skin: Warm and dry to the touch, no apparent skin lesions or masses noted.     Psychiatric: She has a normal mood and affect. Her behavior is normal. Judgment and thought content normal.         Procedures      Lab Results   Component Value Date    WBC 7.76 " 06/05/2017    HGB 13.3 06/05/2017    HCT 40.3 06/05/2017    MCV 91.0 06/05/2017     06/05/2017     Lab Results   Component Value Date    GLUCOSE 133 (H) 06/05/2017    BUN 11 06/05/2017    CREATININE 0.75 06/05/2017    EGFRIFNONA 79 06/05/2017    BCR 14.7 06/05/2017    CO2 26.0 06/05/2017    CALCIUM 9.5 06/05/2017    ALBUMIN 4.30 06/05/2017    AST 32 06/05/2017    ALT 41 06/05/2017     No results found for: CHOL  No results found for: TRIG  No results found for: HDL  No components found for: LDLCALC  No results found for: LDL  No results found for: HDLLDLRATIO  No components found for: CHOLHDL  No results found for: HGBA1C  Lab Results   Component Value Date    TSH 1.700 06/05/2017           ASSESSMENT AND PLAN:  1 Supra ventricle tachycardia #2 palpitation #3 hyperlipidemia #4 type 2 diabetes     Clinically there is no sign of any cardiac decompensation based on the clinical history physical finding.  Previously again today multiple different options discussed with the patient.  Option #1 simple medical management #2 anti arrhythmic medication #3 EP study and ablations.  Pros and cons of each option discussed with the patient and the family.  However given the recurrent supraventricular tachycardia requiring hospitalization and IV medications and she decided to proceed with EP study ablation.  Procedure risk discussed with the patient.  Risks included but not limited to infection, bleeding, stroke, pneumothorax, hematoma, heart block understand willing to proceed forward.  We will hold Cardizem 2 to 3 days prior to the procedure..  We will see her back in 6 month R depends on patient clinical conditions.  She will continue metformin with history diabetes . Risk factor lifestyle modification discussed.  Significant of low carbohydrate, low-fat and dash diet explained    Mary was seen today for rapid heart rate.    Diagnoses and all orders for this visit:    SVT (supraventricular tachycardia)  (CMS/[facility])    Hyperlipidemia, unspecified hyperlipidemia type    Palpitation    Type 2 diabetes mellitus without complication, without long-term current use of insulin (CMS/[facility])        Sony Morgan MD  8/28/2019  9:20 AM

## 2019-09-09 DIAGNOSIS — R00.2 PALPITATION: ICD-10-CM

## 2019-09-09 DIAGNOSIS — I47.1 SVT (SUPRAVENTRICULAR TACHYCARDIA) (HCC): Primary | ICD-10-CM

## 2019-09-09 RX ORDER — SODIUM CHLORIDE 9 MG/ML
50 INJECTION, SOLUTION INTRAVENOUS CONTINUOUS
Status: CANCELLED | OUTPATIENT
Start: 2019-09-23

## 2019-09-20 ENCOUNTER — ANESTHESIA EVENT (OUTPATIENT)
Dept: CARDIOLOGY | Facility: HOSPITAL | Age: 59
End: 2019-09-20

## 2019-09-20 RX ORDER — DILTIAZEM HYDROCHLORIDE 120 MG/1
120 TABLET, FILM COATED ORAL DAILY
Status: ON HOLD | COMMUNITY
End: 2019-09-23

## 2019-09-23 ENCOUNTER — ANESTHESIA (OUTPATIENT)
Dept: CARDIOLOGY | Facility: HOSPITAL | Age: 59
End: 2019-09-23

## 2019-09-23 ENCOUNTER — HOSPITAL ENCOUNTER (OUTPATIENT)
Facility: HOSPITAL | Age: 59
Discharge: HOME | End: 2019-09-25
Attending: INTERNAL MEDICINE | Admitting: INTERNAL MEDICINE

## 2019-09-23 DIAGNOSIS — I47.1 SVT (SUPRAVENTRICULAR TACHYCARDIA) (HCC): ICD-10-CM

## 2019-09-23 DIAGNOSIS — R00.2 PALPITATION: ICD-10-CM

## 2019-09-23 LAB
ANION GAP SERPL CALCULATED.3IONS-SCNC: 10 MMOL/L (ref 5–15)
BUN BLD-MCNC: 7 MG/DL (ref 8–23)
BUN/CREAT SERPL: 9.3 (ref 7–25)
CALCIUM SPEC-SCNC: 10 MG/DL (ref 8.6–10.5)
CHLORIDE SERPL-SCNC: 101 MMOL/L (ref 98–107)
CO2 SERPL-SCNC: 29 MMOL/L (ref 22–29)
CREAT BLD-MCNC: 0.75 MG/DL (ref 0.57–1)
DEPRECATED RDW RBC AUTO: 43 FL (ref 37–54)
DEPRECATED RDW RBC AUTO: 46.3 FL (ref 37–54)
ERYTHROCYTE [DISTWIDTH] IN BLOOD BY AUTOMATED COUNT: 13.2 % (ref 12.3–15.4)
ERYTHROCYTE [DISTWIDTH] IN BLOOD BY AUTOMATED COUNT: 13.6 % (ref 12.3–15.4)
GFR SERPL CREATININE-BSD FRML MDRD: 79 ML/MIN/1.73
GLUCOSE BLD-MCNC: 131 MG/DL (ref 65–99)
GLUCOSE BLDC GLUCOMTR-MCNC: 92 MG/DL (ref 70–130)
HCT VFR BLD AUTO: 36.7 % (ref 34–46.6)
HCT VFR BLD AUTO: 40.1 % (ref 34–46.6)
HGB BLD-MCNC: 11.9 G/DL (ref 12–15.9)
HGB BLD-MCNC: 13.2 G/DL (ref 12–15.9)
INR PPP: 0.87 (ref 0.8–1.2)
MCH RBC QN AUTO: 29.2 PG (ref 26.6–33)
MCH RBC QN AUTO: 30.1 PG (ref 26.6–33)
MCHC RBC AUTO-ENTMCNC: 32.4 G/DL (ref 31.5–35.7)
MCHC RBC AUTO-ENTMCNC: 32.9 G/DL (ref 31.5–35.7)
MCV RBC AUTO: 88.7 FL (ref 79–97)
MCV RBC AUTO: 92.7 FL (ref 79–97)
PLATELET # BLD AUTO: 268 10*3/MM3 (ref 140–450)
PLATELET # BLD AUTO: 331 10*3/MM3 (ref 140–450)
PMV BLD AUTO: 9.8 FL (ref 6–12)
PMV BLD AUTO: 9.9 FL (ref 6–12)
POTASSIUM BLD-SCNC: 4.5 MMOL/L (ref 3.5–5.2)
PROTHROMBIN TIME: 11.7 SECONDS (ref 11.1–15.3)
RBC # BLD AUTO: 3.96 10*6/MM3 (ref 3.77–5.28)
RBC # BLD AUTO: 4.52 10*6/MM3 (ref 3.77–5.28)
SODIUM BLD-SCNC: 140 MMOL/L (ref 136–145)
WBC NRBC COR # BLD: 5.98 10*3/MM3 (ref 3.4–10.8)
WBC NRBC COR # BLD: 8.3 10*3/MM3 (ref 3.4–10.8)

## 2019-09-23 PROCEDURE — 25010000002 ENOXAPARIN PER 10 MG: Performed by: INTERNAL MEDICINE

## 2019-09-23 PROCEDURE — C1894 INTRO/SHEATH, NON-LASER: HCPCS | Performed by: INTERNAL MEDICINE

## 2019-09-23 PROCEDURE — 25010000002 ONDANSETRON PER 1 MG: Performed by: NURSE ANESTHETIST, CERTIFIED REGISTERED

## 2019-09-23 PROCEDURE — 82962 GLUCOSE BLOOD TEST: CPT

## 2019-09-23 PROCEDURE — C1730 CATH, EP, 19 OR FEW ELECT: HCPCS | Performed by: INTERNAL MEDICINE

## 2019-09-23 PROCEDURE — 93653 COMPRE EP EVAL TX SVT: CPT | Performed by: INTERNAL MEDICINE

## 2019-09-23 PROCEDURE — 85610 PROTHROMBIN TIME: CPT | Performed by: INTERNAL MEDICINE

## 2019-09-23 PROCEDURE — 80048 BASIC METABOLIC PNL TOTAL CA: CPT | Performed by: INTERNAL MEDICINE

## 2019-09-23 PROCEDURE — 25010000002 DIPHENHYDRAMINE PER 50 MG: Performed by: INTERNAL MEDICINE

## 2019-09-23 PROCEDURE — 93621 COMP EP EVL L PAC&REC C SINS: CPT | Performed by: INTERNAL MEDICINE

## 2019-09-23 PROCEDURE — 85027 COMPLETE CBC AUTOMATED: CPT | Performed by: INTERNAL MEDICINE

## 2019-09-23 PROCEDURE — 25010000002 HEPARIN (PORCINE) PER 1000 UNITS: Performed by: INTERNAL MEDICINE

## 2019-09-23 PROCEDURE — C1733 CATH, EP, OTHR THAN COOL-TIP: HCPCS | Performed by: INTERNAL MEDICINE

## 2019-09-23 PROCEDURE — 25010000002 PROTAMINE SULFATE PER 10 MG: Performed by: INTERNAL MEDICINE

## 2019-09-23 PROCEDURE — 25010000002 DEXAMETHASONE PER 1 MG: Performed by: NURSE ANESTHETIST, CERTIFIED REGISTERED

## 2019-09-23 PROCEDURE — 93613 INTRACARDIAC EPHYS 3D MAPG: CPT | Performed by: INTERNAL MEDICINE

## 2019-09-23 PROCEDURE — 25010000002 MIDAZOLAM PER 1 MG: Performed by: NURSE ANESTHETIST, CERTIFIED REGISTERED

## 2019-09-23 PROCEDURE — 25010000002 ONDANSETRON PER 1 MG: Performed by: INTERNAL MEDICINE

## 2019-09-23 PROCEDURE — 25010000002 AMIODARONE IN DEXTROSE 5% 150-4.21 MG/100ML-% SOLUTION: Performed by: INTERNAL MEDICINE

## 2019-09-23 PROCEDURE — 93005 ELECTROCARDIOGRAM TRACING: CPT | Performed by: INTERNAL MEDICINE

## 2019-09-23 PROCEDURE — 25010000002 FENTANYL CITRATE (PF) 100 MCG/2ML SOLUTION: Performed by: NURSE ANESTHETIST, CERTIFIED REGISTERED

## 2019-09-23 PROCEDURE — 25010000002 PHENYLEPHRINE PER 1 ML: Performed by: NURSE ANESTHETIST, CERTIFIED REGISTERED

## 2019-09-23 PROCEDURE — C1760 CLOSURE DEV, VASC: HCPCS | Performed by: INTERNAL MEDICINE

## 2019-09-23 PROCEDURE — 25010000002 PROPOFOL 10 MG/ML EMULSION: Performed by: NURSE ANESTHETIST, CERTIFIED REGISTERED

## 2019-09-23 PROCEDURE — C1769 GUIDE WIRE: HCPCS | Performed by: INTERNAL MEDICINE

## 2019-09-23 PROCEDURE — 25010000002 HYDROMORPHONE 1 MG/ML SOLUTION: Performed by: NURSE ANESTHETIST, CERTIFIED REGISTERED

## 2019-09-23 RX ORDER — PROPOFOL 10 MG/ML
VIAL (ML) INTRAVENOUS AS NEEDED
Status: DISCONTINUED | OUTPATIENT
Start: 2019-09-23 | End: 2019-09-23 | Stop reason: SURG

## 2019-09-23 RX ORDER — PROTAMINE SULFATE 10 MG/ML
INJECTION, SOLUTION INTRAVENOUS AS NEEDED
Status: DISCONTINUED | OUTPATIENT
Start: 2019-09-23 | End: 2019-09-23 | Stop reason: HOSPADM

## 2019-09-23 RX ORDER — ONDANSETRON 2 MG/ML
INJECTION INTRAMUSCULAR; INTRAVENOUS AS NEEDED
Status: DISCONTINUED | OUTPATIENT
Start: 2019-09-23 | End: 2019-09-23 | Stop reason: SURG

## 2019-09-23 RX ORDER — DEXAMETHASONE SODIUM PHOSPHATE 4 MG/ML
8 INJECTION, SOLUTION INTRA-ARTICULAR; INTRALESIONAL; INTRAMUSCULAR; INTRAVENOUS; SOFT TISSUE ONCE AS NEEDED
Status: DISCONTINUED | OUTPATIENT
Start: 2019-09-23 | End: 2019-09-23 | Stop reason: HOSPADM

## 2019-09-23 RX ORDER — PROMETHAZINE HYDROCHLORIDE 25 MG/1
25 TABLET ORAL ONCE AS NEEDED
Status: DISCONTINUED | OUTPATIENT
Start: 2019-09-23 | End: 2019-09-23 | Stop reason: HOSPADM

## 2019-09-23 RX ORDER — FLUMAZENIL 0.1 MG/ML
0.2 INJECTION INTRAVENOUS AS NEEDED
Status: DISCONTINUED | OUTPATIENT
Start: 2019-09-23 | End: 2019-09-23 | Stop reason: HOSPADM

## 2019-09-23 RX ORDER — NALOXONE HCL 0.4 MG/ML
0.4 VIAL (ML) INJECTION AS NEEDED
Status: DISCONTINUED | OUTPATIENT
Start: 2019-09-23 | End: 2019-09-23 | Stop reason: HOSPADM

## 2019-09-23 RX ORDER — SODIUM CHLORIDE 9 MG/ML
50 INJECTION, SOLUTION INTRAVENOUS CONTINUOUS
Status: DISCONTINUED | OUTPATIENT
Start: 2019-09-23 | End: 2019-09-25 | Stop reason: HOSPADM

## 2019-09-23 RX ORDER — DEXAMETHASONE SODIUM PHOSPHATE 4 MG/ML
INJECTION, SOLUTION INTRA-ARTICULAR; INTRALESIONAL; INTRAMUSCULAR; INTRAVENOUS; SOFT TISSUE AS NEEDED
Status: DISCONTINUED | OUTPATIENT
Start: 2019-09-23 | End: 2019-09-23 | Stop reason: SURG

## 2019-09-23 RX ORDER — PROMETHAZINE HYDROCHLORIDE 25 MG/ML
12.5 INJECTION, SOLUTION INTRAMUSCULAR; INTRAVENOUS ONCE AS NEEDED
Status: DISCONTINUED | OUTPATIENT
Start: 2019-09-23 | End: 2019-09-23 | Stop reason: HOSPADM

## 2019-09-23 RX ORDER — ONDANSETRON 2 MG/ML
4 INJECTION INTRAMUSCULAR; INTRAVENOUS ONCE AS NEEDED
Status: DISCONTINUED | OUTPATIENT
Start: 2019-09-23 | End: 2019-09-23 | Stop reason: HOSPADM

## 2019-09-23 RX ORDER — FENTANYL CITRATE 50 UG/ML
INJECTION, SOLUTION INTRAMUSCULAR; INTRAVENOUS AS NEEDED
Status: DISCONTINUED | OUTPATIENT
Start: 2019-09-23 | End: 2019-09-23 | Stop reason: SURG

## 2019-09-23 RX ORDER — SOTALOL HYDROCHLORIDE 80 MG/1
80 TABLET ORAL EVERY 12 HOURS SCHEDULED
Status: DISCONTINUED | OUTPATIENT
Start: 2019-09-23 | End: 2019-09-23

## 2019-09-23 RX ORDER — PROMETHAZINE HYDROCHLORIDE 25 MG/1
25 SUPPOSITORY RECTAL ONCE AS NEEDED
Status: DISCONTINUED | OUTPATIENT
Start: 2019-09-23 | End: 2019-09-23 | Stop reason: HOSPADM

## 2019-09-23 RX ORDER — MIDAZOLAM HYDROCHLORIDE 1 MG/ML
INJECTION INTRAMUSCULAR; INTRAVENOUS AS NEEDED
Status: DISCONTINUED | OUTPATIENT
Start: 2019-09-23 | End: 2019-09-23 | Stop reason: SURG

## 2019-09-23 RX ORDER — DIPHENHYDRAMINE HYDROCHLORIDE 50 MG/ML
12.5 INJECTION INTRAMUSCULAR; INTRAVENOUS
Status: DISCONTINUED | OUTPATIENT
Start: 2019-09-23 | End: 2019-09-23 | Stop reason: HOSPADM

## 2019-09-23 RX ORDER — ASPIRIN 81 MG/1
81 TABLET, CHEWABLE ORAL DAILY
Status: DISCONTINUED | OUTPATIENT
Start: 2019-09-23 | End: 2019-09-25 | Stop reason: HOSPADM

## 2019-09-23 RX ORDER — HYDROCODONE BITARTRATE AND ACETAMINOPHEN 5; 325 MG/1; MG/1
1 TABLET ORAL EVERY 4 HOURS PRN
Status: DISCONTINUED | OUTPATIENT
Start: 2019-09-23 | End: 2019-09-25 | Stop reason: HOSPADM

## 2019-09-23 RX ORDER — ONDANSETRON 2 MG/ML
4 INJECTION INTRAMUSCULAR; INTRAVENOUS EVERY 6 HOURS PRN
Status: DISCONTINUED | OUTPATIENT
Start: 2019-09-23 | End: 2019-09-25 | Stop reason: HOSPADM

## 2019-09-23 RX ORDER — HEPARIN SODIUM 1000 [USP'U]/ML
INJECTION, SOLUTION INTRAVENOUS; SUBCUTANEOUS AS NEEDED
Status: DISCONTINUED | OUTPATIENT
Start: 2019-09-23 | End: 2019-09-23 | Stop reason: HOSPADM

## 2019-09-23 RX ORDER — ACETAMINOPHEN 650 MG/1
650 SUPPOSITORY RECTAL ONCE AS NEEDED
Status: DISCONTINUED | OUTPATIENT
Start: 2019-09-23 | End: 2019-09-23 | Stop reason: HOSPADM

## 2019-09-23 RX ORDER — LIDOCAINE HYDROCHLORIDE 20 MG/ML
INJECTION, SOLUTION INFILTRATION; PERINEURAL AS NEEDED
Status: DISCONTINUED | OUTPATIENT
Start: 2019-09-23 | End: 2019-09-23 | Stop reason: SURG

## 2019-09-23 RX ORDER — ACETAMINOPHEN 325 MG/1
650 TABLET ORAL ONCE AS NEEDED
Status: DISCONTINUED | OUTPATIENT
Start: 2019-09-23 | End: 2019-09-23 | Stop reason: HOSPADM

## 2019-09-23 RX ORDER — DILTIAZEM HYDROCHLORIDE 120 MG/1
120 TABLET, FILM COATED ORAL DAILY
COMMUNITY
End: 2019-10-21

## 2019-09-23 RX ORDER — DIPHENHYDRAMINE HYDROCHLORIDE 50 MG/ML
INJECTION INTRAMUSCULAR; INTRAVENOUS AS NEEDED
Status: DISCONTINUED | OUTPATIENT
Start: 2019-09-23 | End: 2019-09-23 | Stop reason: HOSPADM

## 2019-09-23 RX ORDER — DILTIAZEM HYDROCHLORIDE 120 MG/1
120 CAPSULE, COATED, EXTENDED RELEASE ORAL
Status: DISCONTINUED | OUTPATIENT
Start: 2019-09-23 | End: 2019-09-25 | Stop reason: HOSPADM

## 2019-09-23 RX ORDER — EPHEDRINE SULFATE 50 MG/ML
5 INJECTION, SOLUTION INTRAVENOUS ONCE AS NEEDED
Status: DISCONTINUED | OUTPATIENT
Start: 2019-09-23 | End: 2019-09-23 | Stop reason: HOSPADM

## 2019-09-23 RX ORDER — LABETALOL HYDROCHLORIDE 5 MG/ML
5 INJECTION, SOLUTION INTRAVENOUS
Status: DISCONTINUED | OUTPATIENT
Start: 2019-09-23 | End: 2019-09-23 | Stop reason: HOSPADM

## 2019-09-23 RX ORDER — EPHEDRINE SULFATE 50 MG/ML
INJECTION, SOLUTION INTRAVENOUS AS NEEDED
Status: DISCONTINUED | OUTPATIENT
Start: 2019-09-23 | End: 2019-09-23 | Stop reason: SURG

## 2019-09-23 RX ADMIN — PHENYLEPHRINE HYDROCHLORIDE 100 MCG: 10 INJECTION INTRAVENOUS at 11:59

## 2019-09-23 RX ADMIN — ONDANSETRON 4 MG: 2 INJECTION INTRAMUSCULAR; INTRAVENOUS at 15:27

## 2019-09-23 RX ADMIN — PHENYLEPHRINE HYDROCHLORIDE 100 MCG: 10 INJECTION INTRAVENOUS at 10:45

## 2019-09-23 RX ADMIN — FENTANYL CITRATE 25 MCG: 50 INJECTION, SOLUTION INTRAMUSCULAR; INTRAVENOUS at 11:46

## 2019-09-23 RX ADMIN — METFORMIN HYDROCHLORIDE 500 MG: 500 TABLET, FILM COATED ORAL at 18:47

## 2019-09-23 RX ADMIN — HYDROMORPHONE HYDROCHLORIDE 0.5 MG: 1 INJECTION, SOLUTION INTRAMUSCULAR; INTRAVENOUS; SUBCUTANEOUS at 13:47

## 2019-09-23 RX ADMIN — FENTANYL CITRATE 50 MCG: 50 INJECTION, SOLUTION INTRAMUSCULAR; INTRAVENOUS at 10:08

## 2019-09-23 RX ADMIN — PROPOFOL 20 MG: 10 INJECTION, EMULSION INTRAVENOUS at 11:46

## 2019-09-23 RX ADMIN — LIDOCAINE HYDROCHLORIDE 75 MG: 20 INJECTION, SOLUTION INFILTRATION; PERINEURAL at 10:08

## 2019-09-23 RX ADMIN — FENTANYL CITRATE 25 MCG: 50 INJECTION, SOLUTION INTRAMUSCULAR; INTRAVENOUS at 12:45

## 2019-09-23 RX ADMIN — HYDROCODONE BITARTRATE AND ACETAMINOPHEN 1 TABLET: 5; 325 TABLET ORAL at 23:07

## 2019-09-23 RX ADMIN — PROPOFOL 100 MG: 10 INJECTION, EMULSION INTRAVENOUS at 10:08

## 2019-09-23 RX ADMIN — PROPOFOL 40 MG: 10 INJECTION, EMULSION INTRAVENOUS at 11:41

## 2019-09-23 RX ADMIN — ONDANSETRON 4 MG: 2 INJECTION INTRAMUSCULAR; INTRAVENOUS at 12:53

## 2019-09-23 RX ADMIN — SOTALOL HYDROCHLORIDE 80 MG: 80 TABLET ORAL at 13:49

## 2019-09-23 RX ADMIN — EPHEDRINE SULFATE 10 MG: 50 INJECTION INTRAVENOUS at 10:26

## 2019-09-23 RX ADMIN — DEXAMETHASONE SODIUM PHOSPHATE 4 MG: 4 INJECTION, SOLUTION INTRAMUSCULAR; INTRAVENOUS at 12:53

## 2019-09-23 RX ADMIN — DILTIAZEM HYDROCHLORIDE 120 MG: 120 CAPSULE, COATED, EXTENDED RELEASE ORAL at 18:47

## 2019-09-23 RX ADMIN — SODIUM CHLORIDE: 900 INJECTION, SOLUTION INTRAVENOUS at 09:58

## 2019-09-23 RX ADMIN — SODIUM CHLORIDE: 900 INJECTION, SOLUTION INTRAVENOUS at 11:52

## 2019-09-23 RX ADMIN — PHENYLEPHRINE HYDROCHLORIDE 100 MCG: 10 INJECTION INTRAVENOUS at 11:02

## 2019-09-23 RX ADMIN — ASPIRIN 81 MG 81 MG: 81 TABLET ORAL at 18:49

## 2019-09-23 RX ADMIN — MIDAZOLAM HYDROCHLORIDE 2 MG: 2 INJECTION, SOLUTION INTRAMUSCULAR; INTRAVENOUS at 09:58

## 2019-09-23 RX ADMIN — AMIODARONE HYDROCHLORIDE 150 MG: 1.5 INJECTION, SOLUTION INTRAVENOUS at 12:41

## 2019-09-23 RX ADMIN — ENOXAPARIN SODIUM 40 MG: 40 INJECTION SUBCUTANEOUS at 18:50

## 2019-09-23 RX ADMIN — PHENYLEPHRINE HYDROCHLORIDE 100 MCG: 10 INJECTION INTRAVENOUS at 11:15

## 2019-09-23 NOTE — ANESTHESIA PREPROCEDURE EVALUATION
Anesthesia Evaluation     no history of anesthetic complications:  NPO Solid Status: > 8 hours  NPO Liquid Status: > 8 hours           Airway   Mallampati: II  TM distance: >3 FB  Neck ROM: full  No difficulty expected  Dental - normal exam     Pulmonary - normal exam    breath sounds clear to auscultation  (-) COPD, asthma, sleep apnea, not a smoker    ROS comment: snores  Cardiovascular - normal exam  Exercise tolerance: good (4-7 METS)    ECG reviewed  Rhythm: regular  Rate: normal    (+) dysrhythmias Tachycardia, hyperlipidemia,   (-) valvular problems/murmurs, angina, cardiac stents, PVD, DVT    ROS comment: Normal sinus rhythm  Cannot rule out Anterior infarct , age undetermined  Abnormal ECG    · Estimated EF = 61%.  · Left ventricular systolic function is normal.  · The tricuspid valve is grossly normal. Trace tricuspid valve regurgitation is present. No evidence of pulmonary hypertension is present.    Neuro/Psych  (-) seizures, TIA, CVA, headaches, psychiatric history  GI/Hepatic/Renal/Endo    (+)   diabetes mellitus type 2,   (-) GERD, hepatitis, liver disease, no renal disease, hypothyroidism    Musculoskeletal (-) negative ROS    (-) arthralgias  Abdominal    Substance History - negative use  (-) alcohol use, drug use     OB/GYN          Other        (-) arthritis, history of cancer                  Anesthesia Plan    ASA 3     general     intravenous induction   Anesthetic plan, all risks, benefits, and alternatives have been provided, discussed and informed consent has been obtained with: patient, spouse/significant other and child.

## 2019-09-23 NOTE — PLAN OF CARE
Problem: Patient Care Overview  Goal: Plan of Care Review  Outcome: Ongoing (interventions implemented as appropriate)   09/23/19 3676   Coping/Psychosocial   Plan of Care Reviewed With patient   Plan of Care Review   Progress no change   OTHER   Outcome Summary patient complaining of nausea but not complaining of pain       Problem: Arrhythmia/Dysrhythmia (Symptomatic) (Adult)  Goal: Signs and Symptoms of Listed Potential Problems Will be Absent, Minimized or Managed (Arrhythmia/Dysrhythmia)  Outcome: Ongoing (interventions implemented as appropriate)

## 2019-09-23 NOTE — ANESTHESIA POSTPROCEDURE EVALUATION
Patient: Mary Borges    Procedure Summary     Date:  09/23/19 Room / Location:  [facility] CATH/EP LAB 3 / [facility] CATH INVASIVE LOCATION    Anesthesia Start:  0958 Anesthesia Stop:  1318    Procedure:  EP/Ablation (N/A ) Diagnosis:       SVT (supraventricular tachycardia) (CMS/HCC)      Palpitation    Provider:  Sony Morgan MD Provider:  Von Tubbs MD    Anesthesia Type:  general ASA Status:  3          Anesthesia Type: general  Last vitals  BP   131/62 (09/23/19 0829)   Temp   96.7 °F (35.9 °C) (09/23/19 0829)   Pulse   84 (09/23/19 0829)   Resp   17 (09/23/19 0829)     SpO2   96 % (09/23/19 0829)     Post Anesthesia Care and Evaluation    Patient location during evaluation: bedside  Patient participation: complete - patient participated  Level of consciousness: awake and alert  Pain score: 0  Pain management: adequate  Airway patency: patent  Anesthetic complications: No anesthetic complications  PONV Status: none  Cardiovascular status: acceptable  Respiratory status: acceptable  Hydration status: acceptable

## 2019-09-23 NOTE — ANESTHESIA PROCEDURE NOTES
Airway  Urgency: elective    Date/Time: 9/23/2019 10:12 AM  Airway not difficult    General Information and Staff    Patient location during procedure: OR  CRNA: Kayden Lopez CRNA    Indications and Patient Condition  Indications for airway management: airway protection    Preoxygenated: yes  Mask difficulty assessment: 0 - not attempted    Final Airway Details  Final airway type: supraglottic airway      Successful airway: classic  Size 3    Number of attempts at approach: 1    Additional Comments  Per Eun PERDOMO [email or web address]

## 2019-09-24 LAB
GLUCOSE BLDC GLUCOMTR-MCNC: 124 MG/DL (ref 70–130)
GLUCOSE BLDC GLUCOMTR-MCNC: 127 MG/DL (ref 70–130)
GLUCOSE BLDC GLUCOMTR-MCNC: 167 MG/DL (ref 70–130)
GLUCOSE BLDC GLUCOMTR-MCNC: 254 MG/DL (ref 70–130)

## 2019-09-24 PROCEDURE — 99214 OFFICE O/P EST MOD 30 MIN: CPT | Performed by: INTERNAL MEDICINE

## 2019-09-24 PROCEDURE — 93010 ELECTROCARDIOGRAM REPORT: CPT | Performed by: INTERNAL MEDICINE

## 2019-09-24 PROCEDURE — 82962 GLUCOSE BLOOD TEST: CPT

## 2019-09-24 PROCEDURE — 25010000002 ENOXAPARIN PER 10 MG: Performed by: INTERNAL MEDICINE

## 2019-09-24 PROCEDURE — 93005 ELECTROCARDIOGRAM TRACING: CPT | Performed by: INTERNAL MEDICINE

## 2019-09-24 RX ORDER — MIDODRINE HYDROCHLORIDE 2.5 MG/1
2.5 TABLET ORAL
Status: DISCONTINUED | OUTPATIENT
Start: 2019-09-24 | End: 2019-09-25 | Stop reason: HOSPADM

## 2019-09-24 RX ORDER — FLECAINIDE ACETATE 50 MG/1
50 TABLET ORAL EVERY 12 HOURS SCHEDULED
Status: DISCONTINUED | OUTPATIENT
Start: 2019-09-24 | End: 2019-09-25 | Stop reason: HOSPADM

## 2019-09-24 RX ORDER — MIDODRINE HYDROCHLORIDE 5 MG/1
5 TABLET ORAL ONCE
Status: COMPLETED | OUTPATIENT
Start: 2019-09-24 | End: 2019-09-24

## 2019-09-24 RX ADMIN — ASPIRIN 81 MG 81 MG: 81 TABLET ORAL at 09:48

## 2019-09-24 RX ADMIN — SODIUM CHLORIDE 50 ML/HR: 900 INJECTION, SOLUTION INTRAVENOUS at 11:55

## 2019-09-24 RX ADMIN — FLECAINIDE ACETATE 50 MG: 50 TABLET ORAL at 10:27

## 2019-09-24 RX ADMIN — ENOXAPARIN SODIUM 40 MG: 40 INJECTION SUBCUTANEOUS at 17:14

## 2019-09-24 RX ADMIN — METFORMIN HYDROCHLORIDE 500 MG: 500 TABLET, FILM COATED ORAL at 17:14

## 2019-09-24 RX ADMIN — MIDODRINE HYDROCHLORIDE 5 MG: 5 TABLET ORAL at 13:37

## 2019-09-24 RX ADMIN — SODIUM CHLORIDE 200 ML: 900 INJECTION, SOLUTION INTRAVENOUS at 01:40

## 2019-09-24 RX ADMIN — DILTIAZEM HYDROCHLORIDE 120 MG: 120 CAPSULE, COATED, EXTENDED RELEASE ORAL at 09:48

## 2019-09-24 RX ADMIN — CARBIDOPA AND LEVODOPA 2.5 MG: 50; 200 TABLET, EXTENDED RELEASE ORAL at 17:13

## 2019-09-24 RX ADMIN — METFORMIN HYDROCHLORIDE 500 MG: 500 TABLET, FILM COATED ORAL at 09:48

## 2019-09-24 RX ADMIN — FLECAINIDE ACETATE 50 MG: 50 TABLET ORAL at 20:36

## 2019-09-24 NOTE — PLAN OF CARE
Problem: Patient Care Overview  Goal: Plan of Care Review  Outcome: Ongoing (interventions implemented as appropriate)   09/24/19 9028   Coping/Psychosocial   Plan of Care Reviewed With patient   Plan of Care Review   Progress declining   OTHER   Outcome Summary Pt. started on cardizem and given one dose of sotalol on dayshift. Pt. given norco at 2300 and BP dropped to 88/50. Stow notified and 200 mL bolus given, told to monitor unless patient becomes symptomatic. BP came up to 90/60 following bolus, but has since gone back to 80's sytolic. Will continue to monitor.        Problem: Arrhythmia/Dysrhythmia (Symptomatic) (Adult)  Goal: Signs and Symptoms of Listed Potential Problems Will be Absent, Minimized or Managed (Arrhythmia/Dysrhythmia)  Outcome: Ongoing (interventions implemented as appropriate)

## 2019-09-24 NOTE — NURSING NOTE
Dr. Morgan not on call to call at this time to clarify. Received in report patient to have sotalol 80 mg bid po, per cath lab report. Orders reviewed no order for sotalol. Notes reviewed no mention in Dr. Morgan's note of starting sotalol. Dr. Fields paged. Cath lab answered. [facility] cath lab stated that he remembers that case and stated she was having some rhythm changes down in cath lab and he stated that he heard Dr Morgan state that she needs the sotalol. He said he will explain to Dr Fields when he is finished with the current case. Dr. Fields to call back to let night shift RN know what he wants her to receive.

## 2019-09-24 NOTE — PLAN OF CARE
Problem: Patient Care Overview  Goal: Plan of Care Review  Outcome: Ongoing (interventions implemented as appropriate)   09/24/19 1243   Coping/Psychosocial   Plan of Care Reviewed With patient   Plan of Care Review   Progress improving

## 2019-09-24 NOTE — PROGRESS NOTES
LOS: 0 days   Patient Care Team:  Aldo Perez MD as PCP - General (Family Medicine)    Chief Complaint: Supraventricular tachycardia status post EP study and slow pathway ablation.  Post ablation patient have atrial tachycardia lung the kofi terminalis with a 2 different focus and tachycardia lung the annulus.  No ablation of those arrhythmia performed as those were not clinical tachycardia.  Patient a problem with running low blood pressure her systolic blood pressure is 80 and she had blood pressure one at home 2.  We will continue IV hydration today and start the patient on midodrine and monitor today.       Review of Systems:   ROS  Patient denies orthopnea, PND, nauseous, chest pain fever cough or chills dysuria or hematuria.  Objective     Current Facility-Administered Medications   Medication Dose Route Frequency Provider Last Rate Last Dose   • aspirin chewable tablet 81 mg  81 mg Oral Daily Sony Morgan MD   81 mg at 09/24/19 0948   • diltiaZEM CD (CARDIZEM CD) 24 hr capsule 120 mg  120 mg Oral Q24H Sony Morgan MD   120 mg at 09/24/19 0948   • enoxaparin (LOVENOX) syringe 40 mg  40 mg Subcutaneous Q24H Sony Morgan MD   40 mg at 09/23/19 1850   • flecainide (TAMBOCOR) tablet 50 mg  50 mg Oral Q12H Sony Morgan MD   50 mg at 09/24/19 1027   • HYDROcodone-acetaminophen (NORCO) 5-325 MG per tablet 1 tablet  1 tablet Oral Q4H PRN Sony Morgan MD   1 tablet at 09/23/19 2307   • metFORMIN (GLUCOPHAGE) tablet 500 mg  500 mg Oral BID With Meals Sony Morgan MD   500 mg at 09/24/19 0948   • midodrine (PROAMATINE) tablet 2.5 mg  2.5 mg Oral TID AC Sony Morgan MD       • ondansetron (ZOFRAN) injection 4 mg  4 mg Intravenous Q6H PRN Sony Morgan MD   4 mg at 09/23/19 1527   • sodium chloride 0.9 % infusion  50 mL/hr Intravenous Continuous Sony Morgan MD 50 mL/hr at 09/24/19 1355 50 mL/hr at 09/24/19 1355       Vital Sign Min/Max for last 24 hours  Temp  Min: 96.4 °F (35.8  "°C)  Max: 97.5 °F (36.4 °C)   BP  Min: 86/50  Max: 118/61   Pulse  Min: 56  Max: 78   Resp  Min: 16  Max: 18   SpO2  Min: 95 %  Max: 100 %   Flow (L/min)  Min: 2  Max: 2   Weight  Min: 75.8 kg (167 lb 3.2 oz)  Max: 75.8 kg (167 lb 3.2 oz)     Flowsheet Rows      First Filed Value   Admission Height  162.6 cm (64\") Documented at 09/20/2019 0925   Admission Weight  77.1 kg (170 lb) Documented at 09/20/2019 0925            Intake/Output Summary (Last 24 hours) at 9/24/2019 1409  Last data filed at 9/24/2019 1155  Gross per 24 hour   Intake 1216 ml   Output --   Net 1216 ml       Physical Exam:     General Appearance:    Alert, cooperative, in no acute distress   Lungs:     Clear to auscultation,respirations regular, even and                  unlabored    Heart:    Regular rhythm and normal rate, normal S1 and S2, no            murmur, no gallop, no rub, no click   Chest Wall:    No abnormalities observed   Abdomen:     Normal bowel sounds, no masses, no organomegaly, soft        non-tender, non-distended, no guarding, no rebound                tenderness   Extremities:   Moves all extremities well, no edema, no cyanosis, no             redness   Pulses:   Pulses palpable and equal bilaterally   Skin:   No bleeding, bruising or rash        Results Review:   I reviewed the patient's new clinical results.  Lab Results   Component Value Date    WBC 8.30 09/23/2019    HGB 11.9 (L) 09/23/2019    HCT 36.7 09/23/2019    MCV 92.7 09/23/2019     09/23/2019     Lab Results   Component Value Date    GLUCOSE 131 (H) 09/23/2019    BUN 7 (L) 09/23/2019    CREATININE 0.75 09/23/2019    EGFRIFNONA 79 09/23/2019    BCR 9.3 09/23/2019    CO2 29.0 09/23/2019    CALCIUM 10.0 09/23/2019    ALBUMIN 4.30 06/05/2017    AST 32 06/05/2017    ALT 41 06/05/2017     Lab Results   Component Value Date    TSH 1.700 06/05/2017     Lab Results   Component Value Date    INR 0.87 09/23/2019    PROTIME 11.7 09/23/2019     No results found for: " PTT    EKG:     Telemetry:    Ejection Fraction  No results found for: EF    Echo EF Estimated  Lab Results   Component Value Date    ECHOEFEST 61 02/28/2019         Present on Admission:  **None**    Assessment/Plan   #1 supraventricular tachycardia #2 palpitation #3 history of low blood pressure    Clinically, no sign of cardiac decompensation based the clinical history physical findings.  Will monitor today and continue IV fluid.  Start the patient on midodrine starting a dose of 2.5 mg p.o. 3 times daily and continue flecainide 50 mg p.o. twice daily.    Sony Morgan MD  09/24/19  2:09 PM      Part of this note may be an electronic transcription/translation of spoken language to printed text using the Dragon Dictation system.

## 2019-09-25 VITALS
DIASTOLIC BLOOD PRESSURE: 63 MMHG | TEMPERATURE: 96.3 F | OXYGEN SATURATION: 97 % | HEART RATE: 73 BPM | BODY MASS INDEX: 28.82 KG/M2 | SYSTOLIC BLOOD PRESSURE: 131 MMHG | WEIGHT: 168.8 LBS | HEIGHT: 64 IN | RESPIRATION RATE: 18 BRPM

## 2019-09-25 PROCEDURE — 99214 OFFICE O/P EST MOD 30 MIN: CPT | Performed by: INTERNAL MEDICINE

## 2019-09-25 PROCEDURE — 93010 ELECTROCARDIOGRAM REPORT: CPT | Performed by: INTERNAL MEDICINE

## 2019-09-25 PROCEDURE — 93005 ELECTROCARDIOGRAM TRACING: CPT | Performed by: INTERNAL MEDICINE

## 2019-09-25 RX ORDER — MIDODRINE HYDROCHLORIDE 2.5 MG/1
2.5 TABLET ORAL
Qty: 90 TABLET | Refills: 3 | Status: SHIPPED | OUTPATIENT
Start: 2019-09-25 | End: 2022-01-03

## 2019-09-25 RX ORDER — FLECAINIDE ACETATE 50 MG/1
50 TABLET ORAL EVERY 12 HOURS SCHEDULED
Qty: 60 TABLET | Refills: 5 | Status: SHIPPED | OUTPATIENT
Start: 2019-09-25 | End: 2022-01-03

## 2019-09-25 RX ORDER — MIDODRINE HYDROCHLORIDE 2.5 MG/1
2.5 TABLET ORAL
Qty: 90 TABLET | Refills: 3 | Status: SHIPPED | OUTPATIENT
Start: 2019-09-25 | End: 2019-09-25 | Stop reason: SDUPTHER

## 2019-09-25 RX ADMIN — CARBIDOPA AND LEVODOPA 2.5 MG: 50; 200 TABLET, EXTENDED RELEASE ORAL at 08:18

## 2019-09-25 RX ADMIN — FLECAINIDE ACETATE 50 MG: 50 TABLET ORAL at 08:18

## 2019-09-25 RX ADMIN — ASPIRIN 81 MG 81 MG: 81 TABLET ORAL at 08:18

## 2019-09-25 RX ADMIN — SODIUM CHLORIDE 50 ML/HR: 900 INJECTION, SOLUTION INTRAVENOUS at 05:57

## 2019-09-25 RX ADMIN — METFORMIN HYDROCHLORIDE 500 MG: 500 TABLET, FILM COATED ORAL at 08:18

## 2019-09-25 RX ADMIN — DILTIAZEM HYDROCHLORIDE 120 MG: 120 CAPSULE, COATED, EXTENDED RELEASE ORAL at 08:18

## 2019-09-25 NOTE — DISCHARGE SUMMARY
DISCHARGE SUMMARY      Date of Discharge:  9/25/2019    Discharge Diagnosis:   1. SVT (supraventricular tachycardia) (CMS/HCC)    2. Palpitation        Presenting Problem/History of Present Illness  SVT (supraventricular tachycardia) (CMS/HCC) [I47.1]  Palpitation [R00.2]  SVT (supraventricular tachycardia) (CMS/HCC) [I47.1]     Hospital Course  Patient is a 59 y.o. female presented with supraventricular tachycardia with undefined mechanism.  Patient underwent EP study has inducible AV node reentry tachycardia successful slow pathway ablation was performed.  No further SVT was induced however the patient have atrial tachycardia induced unable to map it started on flecainide tolerated very well no further tachycardia noted on the monitor.  The patient has history of chronic blood pressure lower side she was started on midodrine 2.5 mg p.o. 3 times daily.  Post hospital management discussed with the patient the family.    Procedures Performed  Procedure(s):  EP/Ablation       Consults:   Consults     No orders found from 8/25/2019 to 9/24/2019.           Lab Results  Lab Results (last 24 hours)     Procedure Component Value Units Date/Time    POC Glucose Once [[id number]]  (Abnormal) Collected:  09/24/19 1046    Specimen:  Blood Updated:  09/24/19 1111     Glucose 167 mg/dL      Comment: RN NotifiedOperator: [id number] PRAKASHJENISE COTOMichael ID: [id number]             Condition on Discharge: Stable    ROS  Patient denies orthopnea, PND, nauseous, vomiting.  Denies polydipsia polyuria..  Denies fever cough or chills.  Vital Signs  Temp:  [96.3 °F (35.7 °C)-97.7 °F (36.5 °C)] 96.3 °F (35.7 °C)  Heart Rate:  [66-80] 73  Resp:  [18] 18  BP: ()/(53-63) 131/63    Physical Exam:  Physical Exam  Clear to auscultation.  Cardiovascular examination regular rate and rhythm no S3 no pericardial rub abdomen soft nontender extremity no cyanosis or clubbing.  Discharge Disposition  Home or Self Care    Discharge Medications      Discharge Medications      New Medications      Instructions Start Date   flecainide 50 MG tablet  Commonly known as:  TAMBOCOR   50 mg, Oral, Every 12 Hours Scheduled      midodrine 2.5 MG tablet  Commonly known as:  PROAMATINE   2.5 mg, Oral, 3 Times Daily Before Meals         Continue These Medications      Instructions Start Date   aspirin 81 MG chewable tablet   81 mg, Oral, Daily      AZO TABS PO   Oral, Daily      CENTRUM SILVER 50+WOMEN PO   Oral      dilTIAZem 120 MG tablet  Commonly known as:  CARDIZEM   120 mg, Oral, Daily      metFORMIN 500 MG tablet  Commonly known as:  GLUCOPHAGE   500 mg, Oral, 2 Times Daily With Meals      Potassium 99 MG tablet   1 tablet, Oral             DC Instructions: Do not lift weight or drive for 24 hours.    Discharge Diet: cardiac and diabetic    Activity at Discharge: as tolerated    Follow-up Appointments  Future Appointments   Date Time Provider Department Center   3/4/2020 11:15 AM Sony Morgan MD [facility] None         Sony Morgan MD  09/25/19  8:26 AM

## 2019-09-25 NOTE — PLAN OF CARE
Problem: Patient Care Overview  Goal: Plan of Care Review  Outcome: Ongoing (interventions implemented as appropriate)   09/25/19 0512   Coping/Psychosocial   Plan of Care Reviewed With patient   Plan of Care Review   Progress improving     Goal: Individualization and Mutuality  Outcome: Ongoing (interventions implemented as appropriate)    Goal: Discharge Needs Assessment  Outcome: Ongoing (interventions implemented as appropriate)    Goal: Interprofessional Rounds/Family Conf  Outcome: Ongoing (interventions implemented as appropriate)      Problem: Arrhythmia/Dysrhythmia (Symptomatic) (Adult)  Goal: Signs and Symptoms of Listed Potential Problems Will be Absent, Minimized or Managed (Arrhythmia/Dysrhythmia)  Outcome: Ongoing (interventions implemented as appropriate)

## 2019-10-21 ENCOUNTER — OFFICE VISIT (OUTPATIENT)
Dept: CARDIOLOGY | Facility: CLINIC | Age: 59
End: 2019-10-21

## 2019-10-21 VITALS
DIASTOLIC BLOOD PRESSURE: 68 MMHG | WEIGHT: 163.8 LBS | OXYGEN SATURATION: 98 % | SYSTOLIC BLOOD PRESSURE: 114 MMHG | HEIGHT: 64 IN | HEART RATE: 102 BPM | BODY MASS INDEX: 27.96 KG/M2

## 2019-10-21 DIAGNOSIS — R00.2 PALPITATION: ICD-10-CM

## 2019-10-21 DIAGNOSIS — I47.1 SVT (SUPRAVENTRICULAR TACHYCARDIA) (HCC): ICD-10-CM

## 2019-10-21 DIAGNOSIS — E78.5 HYPERLIPIDEMIA, UNSPECIFIED HYPERLIPIDEMIA TYPE: ICD-10-CM

## 2019-10-21 DIAGNOSIS — I47.1 PAROXYSMAL SVT (SUPRAVENTRICULAR TACHYCARDIA) (HCC): Primary | ICD-10-CM

## 2019-10-21 DIAGNOSIS — N39.0 RECURRENT UTI: Primary | ICD-10-CM

## 2019-10-21 DIAGNOSIS — E11.9 TYPE 2 DIABETES MELLITUS WITHOUT COMPLICATION, WITHOUT LONG-TERM CURRENT USE OF INSULIN (HCC): ICD-10-CM

## 2019-10-21 PROCEDURE — 93000 ELECTROCARDIOGRAM COMPLETE: CPT | Performed by: INTERNAL MEDICINE

## 2019-10-21 PROCEDURE — 99213 OFFICE O/P EST LOW 20 MIN: CPT | Performed by: INTERNAL MEDICINE

## 2019-10-21 NOTE — PROGRESS NOTES
Mary Borges  59 y.o. female    10/21/2019  1. SVT (supraventricular tachycardia) (CMS/[facility])    2. Palpitation    3. Hyperlipidemia, unspecified hyperlipidemia type    4. Type 2 diabetes mellitus without complication, without long-term current use of insulin (CMS/[facility])        History of Present Illness:    Body mass index is 28.12 kg/m². BMI is above normal parameters. Recommendations include: exercise counseling, nutrition counseling and referral to primary care.         59 years old patient is EP study and slow pathway ablation for the recurrence of tachycardia she is pleased with her clinical outcome.  She did have nonsustained atrial tachycardia along the kofi terminalis I am unable to map it.  For which patient started on flecainide history doing remarkably well from cardiac point of view.  Patient  problem with low blood pressure she was started on midodrine but unable to tolerate with  more than 10-15 the year history of palpitation recently evaluated at [facility] facility for supraventricular tachycardia lasting more than 2 hours responded to adenosine subsequently discharged and presented today to discuss the further options with the previous history of evaluation. at [facility] ER.  Fortunately she iconverted to sinus rhythm.  On the rhythm strip showed documented supraventricular tachycardia with a heart rate up to 1 70 bpm.  She denies symptom of lightheaded and dizziness main symptom during tachycardia and shortness of breath.  Her other medical history included diabetes and hyperlipidemia and complaining of plantar fasciitis. Patient had episode of palpitation and tachycardia and early part of November 2017.      2/2019  · Estimated EF = 61%.  · Left ventricular systolic function is normal.  · The tricuspid valve is grossly normal. Trace tricuspid valve regurgitation is present. No evidence of pulmonary hypertension is present.    STRESS TEST2/2019  Clinical impression     Mildly  impaired exercise capacity     2.  no ST-T wave changes consistent with ischemic     3.  no arrhythmia noted    19  #1 electrophysiological study performed by pacing and recording from the right atrium, pacing and recording from the left atrium with CS catheter, pacing and recording from the right ventricular apex.     #2 easily reproducibly inducible supraventricular tachycardia and mechanism confirmed to be slow fast AV philly reentrant tachycardia while pacing from the left atrium with the CS catheter and pacing from the right atrium with a short long cycle sequence. TCL  370     #3 successful slow pathway ablation performed with conventional mapping technique and 3-dimensional mapping  (3D) mapping (NAVAX) manufacture [employer].     #4  No further AV philly reentry tachycardia induced.  However the patient continue having different tachycardia 1 along the kofi terminalis 1 lung the annulus and another one ulcer along the kofi terminalis.  All these tachycardia was spontaneously induced and spontaneously converted and 1 of them has to be paced out.  Do not know which one will be clinical tachycardia after ablation of  long-standing history of supraventricular arrhythmia     Catheter Placement: RV catheter manufacture [employer], CS catheter and RA catheters were used from left femoral vein access under fluoroscopic.     Ablation catheter: Placed from the right femoral vein access and positioned across the tricuspid annulus to obtain AV ratio less than 1.     SUBJECTIVE:    No Known Allergies      Past Medical History:   Diagnosis Date   • Diabetes mellitus (CMS/HCC)    • Hyperlipidemia    • Palpitations    • Plantar fasciitis          Past Surgical History:   Procedure Laterality Date   • CARDIAC ELECTROPHYSIOLOGY PROCEDURE N/A 2019    Procedure: EP/Ablation;  Surgeon: Sony Morgan MD;  Location: [facility] CATH INVASIVE LOCATION;  Service: Cardiology   •  SECTION           Family History    Problem Relation Age of Onset   • Cancer Mother    • Heart disease Father    • Diabetes Father    • Diabetes Sister    • Cancer Maternal Grandmother          Social History     Socioeconomic History   • Marital status:      Spouse name: Not on file   • Number of children: Not on file   • Years of education: Not on file   • Highest education level: Not on file   Tobacco Use   • Smoking status: Never Smoker   • Smokeless tobacco: Never Used   Substance and Sexual Activity   • Alcohol use: No   • Drug use: No   • Sexual activity: Defer         Current Outpatient Medications   Medication Sig Dispense Refill   • aspirin 81 MG chewable tablet Chew 81 mg Daily.     • diltiaZEM (CARDIZEM) 120 MG tablet Take 120 mg by mouth Daily.     • flecainide (TAMBOCOR) 50 MG tablet Take 1 tablet by mouth Every 12 (Twelve) Hours. 60 tablet 5   • metFORMIN (GLUCOPHAGE) 500 MG tablet Take 500 mg by mouth 2 (Two) Times a Day With Meals.     • midodrine (PROAMATINE) 2.5 MG tablet Take 1 tablet by mouth 3 (Three) Times a Day Before Meals. 90 tablet 3   • Multiple Vitamins-Minerals (CENTRUM SILVER 50+WOMEN PO) Take  by mouth.     • Phenazopyridine HCl (AZO TABS PO) Take  by mouth Daily.     • Potassium 99 MG tablet Take 1 tablet by mouth.       No current facility-administered medications for this visit.            Review of Systems:     Constitutional:  Denies recent weight loss, weight gain, fever or chills, no change in exercise tolerance.     HENT:  Denies any hearing loss, epistaxis, hoarseness, or difficulty speaking.     Eyes: Wears eyeglasses or contact lenses.    Respiratory:  Denies dyspnea with exertion,no cough, wheezing, or hemoptysis.     Cardiovascular: See H&P    Gastrointestinal:  Denies change in bowel habits, dyspepsia, ulcer disease, hematochezia, or melena.     Endocrine: Negative for cold intolerance, heat intolerance, polydipsia, polyphagia and polyuria. Denies any history of weight change, polydipsia,  "polyuria.     Genitourinary: Recurrent urinary tract infection      Musculoskeletal: Denies any history of arthritic symptoms or back problems.     Skin:  Denies any change in hair or nails, rashes, or skin lesions.     Allergic/Immunologic: Negative.  Negative for environmental allergies, food allergies and immunocompromised state.     Neurological:  Denies any history of recurrent headaches, strokes, TIA, or seizure disorder.     Hematological: Denies any food allergies, seasonal allergies, bleeding disorders, or lymphadenopathy.     Psychiatric/Behavioral: Denies any history of depression, substance abuse, or change in cognitive function.       OBJECTIVE:    /68 (BP Location: Left arm, Patient Position: Sitting, Cuff Size: Adult)   Pulse 102   Ht 162.6 cm (64\")   Wt 74.3 kg (163 lb 12.8 oz)   SpO2 98%   BMI 28.12 kg/m²       Physical Exam:     Constitutional: Cooperative, alert and oriented, well-developed, well-nourished, in no acute distress.     HENT:   Head: Normocephalic, normal hair patterns, no masses or tenderness.  Ears, Nose, and Throat: No gross abnormalities. No pallor or cyanosis. Dentition good.   Eyes: EOMS intact, PERRL, conjunctivae and lids unremarkable. Fundoscopic exam and visual fields not performed.   Neck: No palpable masses or adenopathy, no thyromegaly, no JVD, carotid pulses are full and equal bilaterally and without  Bruits.     Cardiovascular: Regular rhythm, S1 and S2 normal, no S3 or S4. Apical impulse not displaced. No murmurs, gallops, or rubs detected.     Pulmonary/Chest: Chest: normal symmetry, no tenderness to palpation, normal respiratory excursion, no intercostal retraction, no use of accessory muscles. Pulmonary: Normal breath sounds. No rales or rhonchi.    Abdominal: Abdomen soft, bowel sounds normoactive, no masses, no hepatosplenomegaly, non-tender, no bruits.     Musculoskeletal: No deformities, clubbing, cyanosis, erythema, or edema observed. There are no " spinal abnormalities noted. Normal muscle strength and tone. Pulses full and equal in all extremities, no bruits auscultated.     Neurological: No gross motor or sensory deficits noted, affect appropriate, oriented to time, person, place.     Skin: Warm and dry to the touch, no apparent skin lesions or masses noted.     Psychiatric: She has a normal mood and affect. Her behavior is normal. Judgment and thought content normal.         Procedures      Lab Results   Component Value Date    WBC 8.30 09/23/2019    HGB 11.9 (L) 09/23/2019    HCT 36.7 09/23/2019    MCV 92.7 09/23/2019     09/23/2019     Lab Results   Component Value Date    GLUCOSE 131 (H) 09/23/2019    BUN 7 (L) 09/23/2019    CREATININE 0.75 09/23/2019    EGFRIFNONA 79 09/23/2019    BCR 9.3 09/23/2019    CO2 29.0 09/23/2019    CALCIUM 10.0 09/23/2019    ALBUMIN 4.30 06/05/2017    AST 32 06/05/2017    ALT 41 06/05/2017     No results found for: CHOL  No results found for: TRIG  No results found for: HDL  No components found for: LDLCALC  No results found for: LDL  No results found for: HDLLDLRATIO  No components found for: CHOLHDL  No results found for: HGBA1C  Lab Results   Component Value Date    TSH 1.700 06/05/2017           ASSESSMENT AND PLAN:  1 Supra ventricle tachycardia   #2 palpitation   #3 hyperlipidemia   #4 type 2 diabetes  59 years old patient with a long-standing history of palpitation underwent EP study is inducible supraventricular tachycardia with successful slow pathway ablation performed.  Post ablation patient have nonsustained atrial tachycardia lung the kofi terminalis unable to map it patient was started on flecainide.  Cardizem discontinued.  She is doing remarkably well from cardiac point of view.  She her main problem is recurrent urinary tract infection she want to be referred to urologist.  The Cardizem was discontinued.  Also had a history of intermittent diarrhea for which I asked the patient increase the water  intake with salt palatable to the taste.  See her back in 6 months undependable patient clinical condition  Mary was seen today for rapid heart rate.    Diagnoses and all orders for this visit:    SVT (supraventricular tachycardia) (CMS/[facility])    Palpitation    Hyperlipidemia, unspecified hyperlipidemia type    Type 2 diabetes mellitus without complication, without long-term current use of insulin (CMS/[facility])        Sony Morgan MD  10/21/2019  2:08 PM

## 2019-11-26 ENCOUNTER — HOSPITAL ENCOUNTER (OUTPATIENT)
Dept: ULTRASOUND IMAGING | Facility: HOSPITAL | Age: 59
Discharge: HOME | End: 2019-11-26
Admitting: UROLOGY

## 2019-11-26 DIAGNOSIS — N39.0 URINARY TRACT INFECTION WITHOUT HEMATURIA, SITE UNSPECIFIED: ICD-10-CM

## 2019-11-26 PROCEDURE — 76775 US EXAM ABDO BACK WALL LIM: CPT

## 2021-06-22 ENCOUNTER — TELEPHONE (OUTPATIENT)
Dept: FAMILY MEDICINE CLINIC | Facility: CLINIC | Age: 61
End: 2021-06-22

## 2021-06-23 ENCOUNTER — OFFICE VISIT (OUTPATIENT)
Dept: FAMILY MEDICINE CLINIC | Facility: CLINIC | Age: 61
End: 2021-06-23

## 2021-06-23 VITALS
WEIGHT: 183 LBS | HEIGHT: 64 IN | BODY MASS INDEX: 31.24 KG/M2 | DIASTOLIC BLOOD PRESSURE: 70 MMHG | RESPIRATION RATE: 20 BRPM | OXYGEN SATURATION: 98 % | HEART RATE: 79 BPM | SYSTOLIC BLOOD PRESSURE: 118 MMHG | TEMPERATURE: 97.3 F

## 2021-06-23 DIAGNOSIS — Z11.59 ENCOUNTER FOR HEPATITIS C SCREENING TEST FOR LOW RISK PATIENT: ICD-10-CM

## 2021-06-23 DIAGNOSIS — Z13.29 THYROID DISORDER SCREEN: ICD-10-CM

## 2021-06-23 DIAGNOSIS — R29.898 WEAKNESS OF BOTH LOWER EXTREMITIES: ICD-10-CM

## 2021-06-23 DIAGNOSIS — E55.9 VITAMIN D DEFICIENCY: ICD-10-CM

## 2021-06-23 DIAGNOSIS — Z13.6 SCREENING FOR CARDIOVASCULAR CONDITION: ICD-10-CM

## 2021-06-23 DIAGNOSIS — E11.65 TYPE 2 DIABETES MELLITUS WITH HYPERGLYCEMIA, WITHOUT LONG-TERM CURRENT USE OF INSULIN (HCC): ICD-10-CM

## 2021-06-23 DIAGNOSIS — Z00.00 ROUTINE GENERAL MEDICAL EXAMINATION AT A HEALTH CARE FACILITY: Primary | ICD-10-CM

## 2021-06-23 DIAGNOSIS — Z76.89 ENCOUNTER TO ESTABLISH CARE: ICD-10-CM

## 2021-06-23 DIAGNOSIS — Z13.220 LIPID SCREENING: ICD-10-CM

## 2021-06-23 PROCEDURE — 99386 PREV VISIT NEW AGE 40-64: CPT | Performed by: NURSE PRACTITIONER

## 2021-06-23 RX ORDER — CETIRIZINE HYDROCHLORIDE 10 MG/1
10 TABLET ORAL DAILY
COMMUNITY

## 2021-06-23 RX ORDER — DOXYCYCLINE 100 MG/1
CAPSULE ORAL
COMMUNITY
Start: 2021-06-10 | End: 2022-12-28 | Stop reason: SDUPTHER

## 2021-06-24 ENCOUNTER — LAB (OUTPATIENT)
Dept: LAB | Facility: HOSPITAL | Age: 61
End: 2021-06-24

## 2021-06-24 DIAGNOSIS — Z13.6 SCREENING FOR CARDIOVASCULAR CONDITION: ICD-10-CM

## 2021-06-24 DIAGNOSIS — Z13.29 THYROID DISORDER SCREEN: ICD-10-CM

## 2021-06-24 DIAGNOSIS — R29.898 WEAKNESS OF BOTH LOWER EXTREMITIES: ICD-10-CM

## 2021-06-24 DIAGNOSIS — Z13.220 LIPID SCREENING: ICD-10-CM

## 2021-06-24 DIAGNOSIS — E55.9 VITAMIN D DEFICIENCY: ICD-10-CM

## 2021-06-24 DIAGNOSIS — E11.65 TYPE 2 DIABETES MELLITUS WITH HYPERGLYCEMIA, WITHOUT LONG-TERM CURRENT USE OF INSULIN (HCC): ICD-10-CM

## 2021-06-24 DIAGNOSIS — Z11.59 ENCOUNTER FOR HEPATITIS C SCREENING TEST FOR LOW RISK PATIENT: ICD-10-CM

## 2021-06-24 LAB
25(OH)D3 SERPL-MCNC: 40.6 NG/ML (ref 30–100)
ALBUMIN SERPL-MCNC: 4.4 G/DL (ref 3.5–5.2)
ALBUMIN/GLOB SERPL: 1.4 G/DL
ALP SERPL-CCNC: 88 U/L (ref 39–117)
ALT SERPL W P-5'-P-CCNC: 27 U/L (ref 1–33)
ANION GAP SERPL CALCULATED.3IONS-SCNC: 9.5 MMOL/L (ref 5–15)
AST SERPL-CCNC: 26 U/L (ref 1–32)
BILIRUB SERPL-MCNC: 0.3 MG/DL (ref 0–1.2)
BUN SERPL-MCNC: 13 MG/DL (ref 8–23)
BUN/CREAT SERPL: 15.9 (ref 7–25)
CALCIUM SPEC-SCNC: 10.1 MG/DL (ref 8.6–10.5)
CHLORIDE SERPL-SCNC: 98 MMOL/L (ref 98–107)
CHOLEST SERPL-MCNC: 343 MG/DL (ref 0–200)
CO2 SERPL-SCNC: 27.5 MMOL/L (ref 22–29)
CREAT SERPL-MCNC: 0.82 MG/DL (ref 0.57–1)
DEPRECATED RDW RBC AUTO: 44.1 FL (ref 37–54)
ERYTHROCYTE [DISTWIDTH] IN BLOOD BY AUTOMATED COUNT: 13.2 % (ref 12.3–15.4)
GFR SERPL CREATININE-BSD FRML MDRD: 70 ML/MIN/1.73
GLOBULIN UR ELPH-MCNC: 3.1 GM/DL
GLUCOSE SERPL-MCNC: 157 MG/DL (ref 65–99)
HBA1C MFR BLD: 7.8 % (ref 4.8–5.6)
HCT VFR BLD AUTO: 42.2 % (ref 34–46.6)
HCV AB SER DONR QL: NORMAL
HDLC SERPL-MCNC: 77 MG/DL (ref 40–60)
HGB BLD-MCNC: 13.9 G/DL (ref 12–15.9)
IRON 24H UR-MRATE: 101 MCG/DL (ref 37–145)
IRON SATN MFR SERPL: 26 % (ref 20–50)
LDLC SERPL CALC-MCNC: 225 MG/DL (ref 0–100)
LDLC/HDLC SERPL: 2.9 {RATIO}
MAGNESIUM SERPL-MCNC: 2 MG/DL (ref 1.6–2.4)
MCH RBC QN AUTO: 30 PG (ref 26.6–33)
MCHC RBC AUTO-ENTMCNC: 32.9 G/DL (ref 31.5–35.7)
MCV RBC AUTO: 90.9 FL (ref 79–97)
PLATELET # BLD AUTO: 326 10*3/MM3 (ref 140–450)
PMV BLD AUTO: 11.1 FL (ref 6–12)
POTASSIUM SERPL-SCNC: 4.7 MMOL/L (ref 3.5–5.2)
PROT SERPL-MCNC: 7.5 G/DL (ref 6–8.5)
RBC # BLD AUTO: 4.64 10*6/MM3 (ref 3.77–5.28)
SODIUM SERPL-SCNC: 135 MMOL/L (ref 136–145)
T4 FREE SERPL-MCNC: 0.98 NG/DL (ref 0.93–1.7)
TIBC SERPL-MCNC: 386 MCG/DL (ref 298–536)
TRANSFERRIN SERPL-MCNC: 259 MG/DL (ref 200–360)
TRIGL SERPL-MCNC: 212 MG/DL (ref 0–150)
TSH SERPL DL<=0.05 MIU/L-ACNC: 2.69 UIU/ML (ref 0.27–4.2)
VIT B12 BLD-MCNC: 852 PG/ML (ref 211–946)
VLDLC SERPL-MCNC: 41 MG/DL (ref 5–40)
WBC # BLD AUTO: 6.36 10*3/MM3 (ref 3.4–10.8)

## 2021-06-24 PROCEDURE — 83540 ASSAY OF IRON: CPT

## 2021-06-24 PROCEDURE — 82306 VITAMIN D 25 HYDROXY: CPT

## 2021-06-24 PROCEDURE — 83735 ASSAY OF MAGNESIUM: CPT

## 2021-06-24 PROCEDURE — 86803 HEPATITIS C AB TEST: CPT

## 2021-06-24 PROCEDURE — 82607 VITAMIN B-12: CPT

## 2021-06-24 PROCEDURE — 83036 HEMOGLOBIN GLYCOSYLATED A1C: CPT

## 2021-06-24 PROCEDURE — 80050 GENERAL HEALTH PANEL: CPT

## 2021-06-24 PROCEDURE — 84466 ASSAY OF TRANSFERRIN: CPT

## 2021-06-24 PROCEDURE — 84439 ASSAY OF FREE THYROXINE: CPT

## 2021-06-24 PROCEDURE — 80061 LIPID PANEL: CPT

## 2021-06-29 PROBLEM — L92.0 GRANULOMA ANNULARE: Status: ACTIVE | Noted: 2021-06-29

## 2021-07-02 ENCOUNTER — TELEPHONE (OUTPATIENT)
Dept: FAMILY MEDICINE CLINIC | Facility: CLINIC | Age: 61
End: 2021-07-02

## 2021-07-06 PROBLEM — E11.65 TYPE 2 DIABETES MELLITUS WITH HYPERGLYCEMIA, WITHOUT LONG-TERM CURRENT USE OF INSULIN: Status: ACTIVE | Noted: 2017-06-30

## 2021-07-07 ENCOUNTER — TELEPHONE (OUTPATIENT)
Dept: FAMILY MEDICINE CLINIC | Facility: CLINIC | Age: 61
End: 2021-07-07

## 2021-07-08 DIAGNOSIS — E78.5 HYPERLIPIDEMIA, UNSPECIFIED HYPERLIPIDEMIA TYPE: Primary | ICD-10-CM

## 2021-07-08 RX ORDER — ATORVASTATIN CALCIUM 20 MG/1
20 TABLET, FILM COATED ORAL DAILY
Qty: 30 TABLET | Refills: 5 | Status: SHIPPED | OUTPATIENT
Start: 2021-07-08 | End: 2022-01-04 | Stop reason: SDUPTHER

## 2021-10-06 NOTE — TELEPHONE ENCOUNTER
"Called pt, stated she needs refill of metformin 500 mg tablet. Requests to place note to pharmacy for \"oblong pills\" due to previous throat injury. Asked if she had been doing twice daily as requested by provider and stated \"I tried it but I didn't do too well.\"     Order pended. Pharmacy verified.   "

## 2021-12-20 ENCOUNTER — TELEPHONE (OUTPATIENT)
Dept: FAMILY MEDICINE CLINIC | Facility: CLINIC | Age: 61
End: 2021-12-20

## 2021-12-30 ENCOUNTER — TELEPHONE (OUTPATIENT)
Dept: FAMILY MEDICINE CLINIC | Facility: CLINIC | Age: 61
End: 2021-12-30

## 2022-01-03 ENCOUNTER — OFFICE VISIT (OUTPATIENT)
Dept: FAMILY MEDICINE CLINIC | Facility: CLINIC | Age: 62
End: 2022-01-03

## 2022-01-03 VITALS
SYSTOLIC BLOOD PRESSURE: 110 MMHG | TEMPERATURE: 98 F | WEIGHT: 184 LBS | DIASTOLIC BLOOD PRESSURE: 72 MMHG | HEIGHT: 64 IN | BODY MASS INDEX: 31.41 KG/M2 | OXYGEN SATURATION: 98 % | HEART RATE: 83 BPM

## 2022-01-03 DIAGNOSIS — E11.65 TYPE 2 DIABETES MELLITUS WITH HYPERGLYCEMIA, WITHOUT LONG-TERM CURRENT USE OF INSULIN: Primary | ICD-10-CM

## 2022-01-03 DIAGNOSIS — R21 RASH: ICD-10-CM

## 2022-01-03 DIAGNOSIS — E78.5 HYPERLIPIDEMIA, UNSPECIFIED HYPERLIPIDEMIA TYPE: ICD-10-CM

## 2022-01-03 PROCEDURE — 99214 OFFICE O/P EST MOD 30 MIN: CPT | Performed by: NURSE PRACTITIONER

## 2022-01-03 NOTE — PROGRESS NOTES
Chief Complaint  Diabetes (6 month f/u), Hyperlipidemia, and Rash    Subjective    History of Present Illness {CC  Problem List  Visit  Diagnosis   Encounters  Notes  Medications  Labs  Result Review Imaging  Media :23}     Mary Borges presents to [facility] - [place] for   6 mos f/u for DM - reports BS at home run 150-160 but admits to not checking often - last checked approx 1 mo ago. Does report having eye exam approx 1 mo ago at [facility]. Reports new pruritic rash on bilat upper arms and bilat shins - denies new meds, hygiene products or foods - has used OTC Aveeno lotion that helps minimally to relieve sx - does have known chronic skin condition that she sees dermatology for mgmt but this is different rash. Voices no other c/o or concerns today.        Objective     Physical Exam  Vitals and nursing note reviewed.   Constitutional:       General: She is not in acute distress.     Appearance: Normal appearance. She is obese. She is not ill-appearing.   HENT:      Head: Normocephalic and atraumatic.   Eyes:      Conjunctiva/sclera: Conjunctivae normal.      Pupils: Pupils are equal, round, and reactive to light.   Neck:      Vascular: No carotid bruit.   Cardiovascular:      Rate and Rhythm: Normal rate and regular rhythm.      Pulses: Normal pulses.           Dorsalis pedis pulses are 2+ on the right side and 2+ on the left side.        Posterior tibial pulses are 2+ on the right side and 2+ on the left side.      Heart sounds: Normal heart sounds. No murmur heard.      Pulmonary:      Effort: Pulmonary effort is normal.      Breath sounds: Normal breath sounds. No wheezing or rhonchi.   Abdominal:      Tenderness: There is no left CVA tenderness.   Musculoskeletal:         General: Normal range of motion.      Cervical back: Normal range of motion and neck supple. No muscular tenderness.      Right foot: Normal range of motion. No deformity, bunion  or Charcot foot.      Left foot: Normal range of motion. No deformity, bunion or Charcot foot.   Feet:      Right foot:      Protective Sensation: 5 sites tested. 5 sites sensed.      Skin integrity: Skin integrity normal.      Toenail Condition: Right toenails are normal.      Left foot:      Protective Sensation: 5 sites tested. 5 sites sensed.      Skin integrity: Skin integrity normal.      Toenail Condition: Left toenails are normal.      Comments: Lower Extremity Exam:      Cardiovascular:    DP/PT pulses palpable bilat  Skin temp is warm to touch from proximal tibia to distal digits bilat  Musculoskeletal:  Muscle strength is 5/5 for all muscle groups tested bilat  ROM is WNL bilat  ROM of the ankle joint is WNL bilat  Dermatological:   Webspaces 1-4 bilat are clean, dry and intact.   No subcutaneous nodules or masses noted bilat  No open wounds noted bilat  Nails 1-5 bilat are within normal limits for length  Neurological:   Protective sensation normal bilat  Sensation intact to light touch bilat       General:   Diabetic Foot Exam Performed   Lymphadenopathy:      Cervical: No cervical adenopathy.   Skin:     General: Skin is warm and dry.      Capillary Refill: Capillary refill takes less than 2 seconds.      Coloration: Skin is not pale.      Findings: Rash present. No erythema.             Comments: RED - Annular scaly lesions  BLUE - scattered fine maculopapular rash   Neurological:      General: No focal deficit present.      Mental Status: She is alert and oriented to person, place, and time.   Psychiatric:         Mood and Affect: Mood normal.         Behavior: Behavior normal.        Result Review  Data Reviewed:{ Labs  Result Review  Imaging  Med Tab  Media :23}   The following data was reviewed by (Optional):[id number]}  Common labs    Common Labsle 6/24/21 6/24/21 6/24/21 6/24/21    0833 0833 0833 0833   Glucose    157 (A)   BUN    13   Creatinine    0.82   eGFR Non African Am    70   Sodium     "135 (A)   Potassium    4.7   Chloride    98   Calcium    10.1   Albumin    4.40   Total Bilirubin    0.3   Alkaline Phosphatase    88   AST (SGOT)    26   ALT (SGPT)    27   WBC 6.36      Hemoglobin 13.9      Hematocrit 42.2      Platelets 326      Total Cholesterol   343 (A)    Triglycerides   212 (A)    HDL Cholesterol   77 (A)    LDL Cholesterol    225 (A)    Hemoglobin A1C  7.80 (A)     (A) Abnormal value          No Images in the past 120 days found..       Vital Signs:   /72   Pulse 83   Temp 98 °F (36.7 °C)   Ht 162.6 cm (64\")   Wt 83.5 kg (184 lb)   SpO2 98%   BMI 31.58 kg/m²          Assessment and Plan {CC Problem List  Visit Diagnosis  ROS  Review (Popup)  Health Maintenance  Quality  BestPractice  Medications  SmartSets  SnapShot Encounters  Media :23}   Problem List Items Addressed This Visit        Cardiac and Vasculature    Hyperlipidemia       Endocrine and Metabolic    Type 2 diabetes mellitus with hyperglycemia, without long-term current use of insulin (HCC) - Primary    Relevant Medications    metFORMIN (GLUCOPHAGE) 1000 MG tablet    Other Relevant Orders    Hemoglobin A1c    MicroAlbumin, Urine, Random - Urine, Clean Catch      Other Visit Diagnoses     Rash             Diagnosis Plan   1. Type 2 diabetes mellitus with hyperglycemia, without long-term current use of insulin (HCC)  Hemoglobin A1c    MicroAlbumin, Urine, Random - Urine, Clean Catch    metFORMIN (GLUCOPHAGE) 1000 MG tablet   2. Hyperlipidemia, unspecified hyperlipidemia type     3. Rash       - T2DM - A1c 7.8 6/24/21 - will repeat A1c and obtain microalbumin urine today  - Pt reports that she takes 2-500mg metformin BID and has been for some time - original RX stated 500mg BID - will increase metformin to 1000mg BID - new RX submitted. Cont healthy low fat, low carb diet.   - Diabetic foot exam performed and normal  - HLP - repeat lipid panel today - lab already ordered - cont atorvastatin 20mg daily - will " refill pending lipid result.   - Rash - possibly eczema - may use existing clobetasol steroid cream PRN  - RTC 6 mos for f/u    Follow Up {Instructions Charge Capture  Follow-up Communications :23}   Return in about 6 months (around 7/3/2022) for Recheck.  Patient was given instructions and counseling regarding her condition or for health maintenance advice. Please see specific information pulled into the AVS if appropriate            .  This document has been electronically signed by SARA Henriquez on January 4, 2022 08:03 CST

## 2022-01-04 ENCOUNTER — LAB (OUTPATIENT)
Dept: LAB | Facility: HOSPITAL | Age: 62
End: 2022-01-04

## 2022-01-04 DIAGNOSIS — E78.5 HYPERLIPIDEMIA, UNSPECIFIED HYPERLIPIDEMIA TYPE: ICD-10-CM

## 2022-01-04 DIAGNOSIS — E11.65 TYPE 2 DIABETES MELLITUS WITH HYPERGLYCEMIA, WITHOUT LONG-TERM CURRENT USE OF INSULIN: ICD-10-CM

## 2022-01-04 LAB
ALBUMIN UR-MCNC: <1.2 MG/DL
CHOLEST SERPL-MCNC: 204 MG/DL (ref 0–200)
HBA1C MFR BLD: 8.67 % (ref 4.8–5.6)
HDLC SERPL-MCNC: 84 MG/DL (ref 40–60)
LDLC SERPL CALC-MCNC: 98 MG/DL (ref 0–100)
LDLC/HDLC SERPL: 1.13 {RATIO}
TRIGL SERPL-MCNC: 127 MG/DL (ref 0–150)
VLDLC SERPL-MCNC: 22 MG/DL (ref 5–40)

## 2022-01-04 PROCEDURE — 80061 LIPID PANEL: CPT

## 2022-01-04 PROCEDURE — 82043 UR ALBUMIN QUANTITATIVE: CPT

## 2022-01-04 PROCEDURE — 83036 HEMOGLOBIN GLYCOSYLATED A1C: CPT

## 2022-01-04 RX ORDER — ATORVASTATIN CALCIUM 20 MG/1
20 TABLET, FILM COATED ORAL NIGHTLY
Qty: 30 TABLET | Refills: 11 | Status: SHIPPED | OUTPATIENT
Start: 2022-01-04 | End: 2022-12-28 | Stop reason: SDUPTHER

## 2022-07-06 ENCOUNTER — OFFICE VISIT (OUTPATIENT)
Dept: FAMILY MEDICINE CLINIC | Facility: CLINIC | Age: 62
End: 2022-07-06

## 2022-07-06 VITALS
HEART RATE: 79 BPM | TEMPERATURE: 96.9 F | BODY MASS INDEX: 29.78 KG/M2 | OXYGEN SATURATION: 99 % | WEIGHT: 174.4 LBS | RESPIRATION RATE: 20 BRPM | SYSTOLIC BLOOD PRESSURE: 130 MMHG | HEIGHT: 64 IN | DIASTOLIC BLOOD PRESSURE: 82 MMHG

## 2022-07-06 DIAGNOSIS — E11.65 TYPE 2 DIABETES MELLITUS WITH HYPERGLYCEMIA, WITHOUT LONG-TERM CURRENT USE OF INSULIN: ICD-10-CM

## 2022-07-06 DIAGNOSIS — Z13.6 SCREENING FOR CARDIOVASCULAR CONDITION: ICD-10-CM

## 2022-07-06 DIAGNOSIS — Z00.00 ROUTINE GENERAL MEDICAL EXAMINATION AT A HEALTH CARE FACILITY: Primary | ICD-10-CM

## 2022-07-06 DIAGNOSIS — E66.3 OVERWEIGHT WITH BODY MASS INDEX (BMI) OF 29 TO 29.9 IN ADULT: ICD-10-CM

## 2022-07-06 DIAGNOSIS — R53.83 FATIGUE, UNSPECIFIED TYPE: ICD-10-CM

## 2022-07-06 DIAGNOSIS — Z13.29 THYROID DISORDER SCREEN: ICD-10-CM

## 2022-07-06 DIAGNOSIS — E55.9 VITAMIN D DEFICIENCY: ICD-10-CM

## 2022-07-06 DIAGNOSIS — R00.2 PALPITATIONS: ICD-10-CM

## 2022-07-06 DIAGNOSIS — Z12.4 CERVICAL CANCER SCREENING: ICD-10-CM

## 2022-07-06 DIAGNOSIS — E78.5 HYPERLIPIDEMIA, UNSPECIFIED HYPERLIPIDEMIA TYPE: ICD-10-CM

## 2022-07-06 DIAGNOSIS — Z12.31 ENCOUNTER FOR SCREENING MAMMOGRAM FOR MALIGNANT NEOPLASM OF BREAST: ICD-10-CM

## 2022-07-06 DIAGNOSIS — R53.1 WEAKNESS: ICD-10-CM

## 2022-07-06 LAB
EXPIRATION DATE: ABNORMAL
HBA1C MFR BLD: 7.9 %
Lab: 855

## 2022-07-06 PROCEDURE — 99396 PREV VISIT EST AGE 40-64: CPT | Performed by: NURSE PRACTITIONER

## 2022-07-06 PROCEDURE — 93010 ELECTROCARDIOGRAM REPORT: CPT | Performed by: INTERNAL MEDICINE

## 2022-07-06 PROCEDURE — 83036 HEMOGLOBIN GLYCOSYLATED A1C: CPT | Performed by: NURSE PRACTITIONER

## 2022-07-06 PROCEDURE — 93005 ELECTROCARDIOGRAM TRACING: CPT | Performed by: NURSE PRACTITIONER

## 2022-07-06 NOTE — PROGRESS NOTES
Chief Complaint  Annual Exam    Subjective    History of Present Illness {CC  Problem List  Visit  Diagnosis   Encounters  Notes  Medications  Labs  Result Review Imaging  Media :23}     Mary Borges presents to [facility] [place] for   Annual Exam w/ labs - no c/o regarding her chronic health issues - is doing well. Does report generalized UE and LE weakness - has to use cane to help her get up and ambulate - does not have cane today - reports noticing gradually getting weaker over time with loss of balance at times- denies joint pain or swelling. Has hx of cardiac ablation by Dr. Morgan Oct 2019 - has not had cardiology f/u since - does report feelings of racing heart occasionally but never lasts longer than 5 min. Sees Dr. Riojas every 6 mos for issues of recurrent UTI - reports she is scheduled for some type of procedure Sept 14/16 but unsure what the procedure is.        Objective     Physical Exam  Vitals and nursing note reviewed.   Constitutional:       General: She is not in acute distress.     Appearance: Normal appearance.   HENT:      Head: Normocephalic and atraumatic.      Right Ear: Tympanic membrane, ear canal and external ear normal.      Left Ear: Tympanic membrane, ear canal and external ear normal.      Nose: Nose normal. No congestion or rhinorrhea.      Mouth/Throat:      Mouth: Mucous membranes are moist.      Pharynx: Oropharynx is clear.   Eyes:      Extraocular Movements: Extraocular movements intact.      Conjunctiva/sclera: Conjunctivae normal.      Pupils: Pupils are equal, round, and reactive to light.   Neck:      Vascular: No carotid bruit.   Cardiovascular:      Rate and Rhythm: Normal rate and regular rhythm.      Pulses: Normal pulses.      Heart sounds: Normal heart sounds. No murmur heard.  Pulmonary:      Effort: Pulmonary effort is normal.      Breath sounds: Normal breath sounds. No wheezing or rhonchi.   Abdominal:       General: Abdomen is flat. Bowel sounds are normal. There is no distension.      Palpations: Abdomen is soft.      Tenderness: There is no abdominal tenderness. There is no right CVA tenderness or left CVA tenderness.   Musculoskeletal:         General: No swelling, tenderness, deformity or signs of injury. Normal range of motion.      Cervical back: Normal range of motion and neck supple. No muscular tenderness.      Right lower leg: No edema.      Left lower leg: No edema.   Lymphadenopathy:      Cervical: No cervical adenopathy.   Skin:     General: Skin is warm and dry.      Capillary Refill: Capillary refill takes less than 2 seconds.      Coloration: Skin is not pale.      Findings: Lesion (senile keratosis) and rash present. No erythema.             Comments: Annular scaly lesions bilat UE and bilat LE  Senile keratoses on abdomen   Neurological:      General: No focal deficit present.      Mental Status: She is alert and oriented to person, place, and time.   Psychiatric:         Mood and Affect: Mood normal.         Behavior: Behavior normal.        Result Review  Data Reviewed:{ Labs  Result Review  Imaging  Med Tab  Media :23}   The following data was reviewed by (Optional):[id number]}  Common labs    Common Labsle 1/4/22 1/4/22 1/4/22 7/6/22    0826 0826 0826    Total Cholesterol  204 (A)     Triglycerides  127     HDL Cholesterol  84 (A)     LDL Cholesterol   98     Hemoglobin A1C 8.67 (A)   7.9   Microalbumin, Urine   <1.2    (A) Abnormal value            Most Recent A1C    HGBA1C Most Recent 7/6/22   Hemoglobin A1C 7.9             No Images in the past 120 days found..  ECG 12 Lead   Preliminary Result   Test Reason : R53.1,R53.83,R00.2   Blood Pressure :   */*   mmHG   Vent. Rate :  79 BPM     Atrial Rate :  79 BPM      P-R Int : 172 ms          QRS Dur :  74 ms       QT Int : 380 ms       P-R-T Axes :  29  -5   0 degrees      QTc Int : 435 ms      Normal sinus rhythm   Normal ECG   When compared  "with ECG of 21-OCT-2019 14:10,   Nonspecific T wave abnormality now evident in Inferior leads      Referred By: ANGELICA WINN           Confirmed By:              Vital Signs:   /82 (BP Location: Left arm, Patient Position: Sitting, Cuff Size: Adult)   Pulse 79   Temp 96.9 °F (36.1 °C) (Temporal)   Resp 20   Ht 162.6 cm (64\")   Wt 79.1 kg (174 lb 6.4 oz)   SpO2 99%   BMI 29.94 kg/m²          Assessment and Plan {CC Problem List  Visit Diagnosis  ROS  Review (Popup)  Health Maintenance  Quality  BestPractice  Medications  SmartSets  SnapShot Encounters  Media :23}   Problem List Items Addressed This Visit        Cardiac and Vasculature    Hyperlipidemia    Relevant Orders    Lipid panel       Endocrine and Metabolic    Type 2 diabetes mellitus with hyperglycemia, without long-term current use of insulin (HCC)    Relevant Medications    metFORMIN (GLUCOPHAGE) 1000 MG tablet    Other Relevant Orders    POC Glycosylated Hemoglobin (Hb A1C) (Completed)       Other    Overweight with body mass index (BMI) of 29 to 29.9 in adult      Other Visit Diagnoses     Routine general medical examination at a health care facility    -  Primary    Weakness        Relevant Orders    ECG 12 Lead (Completed)    Fatigue, unspecified type        Relevant Orders    ECG 12 Lead (Completed)    Palpitations        Relevant Orders    ECG 12 Lead (Completed)    Screening for cardiovascular condition        Relevant Orders    Comprehensive metabolic panel    CBC No Differential    Urinalysis With Culture If Indicated - Urine, Clean Catch    Vitamin D deficiency        Relevant Orders    Vitamin D 25 hydroxy    Thyroid disorder screen        Relevant Orders    T4, free    TSH    Cervical cancer screening        Relevant Orders    Ambulatory Referral to Gynecology    Encounter for screening mammogram for malignant neoplasm of breast        Relevant Orders    Mammo Screening Bilateral With CAD         Diagnosis Plan   1. " "Routine general medical examination at a health care facility     2. Type 2 diabetes mellitus with hyperglycemia, without long-term current use of insulin (HCC)  POC Glycosylated Hemoglobin (Hb A1C)    metFORMIN (GLUCOPHAGE) 1000 MG tablet   3. Hyperlipidemia, unspecified hyperlipidemia type  Lipid panel   4. Weakness  ECG 12 Lead   5. Fatigue, unspecified type  ECG 12 Lead   6. Palpitations  ECG 12 Lead   7. Screening for cardiovascular condition  Comprehensive metabolic panel    CBC No Differential    Urinalysis With Culture If Indicated - Urine, Clean Catch   8. Vitamin D deficiency  Vitamin D 25 hydroxy   9. Thyroid disorder screen  T4, free    TSH   10. Cervical cancer screening  Ambulatory Referral to Gynecology   11. Encounter for screening mammogram for malignant neoplasm of breast  Mammo Screening Bilateral With CAD   12. Overweight with body mass index (BMI) of 29 to 29.9 in adult       - Annual Exam performed  - T2DM - gradually improving - POCT A1c 7.9 - refill of metformin submitted - cont current dosing - advised strict med and diet control. A1c is slowly trending down - will cont to monitor.   - HLP - Lipid panel ordered - cont atorvastatin at current dosing - no refill needed - cont low fat diet.  - Weakness/fatigue/palpitations - EKG performed by MA in office, \"NSR; Normal ECG\" sent to cardiology for confirmation.  - Screening labs ordered - pt to return fasting to obtain  - PAP - referral to Gyn submitted  - Mammogram ordered - discussed but declined DEXA  - Colonoscopy - discussed but declined  - IMM - discussed but declined  - Body mass index is 29.94 kg/m². BMI is >= 25 and <30. (Overweight) The following options were offered after discussion;: weight loss educational material (shared in after visit summary)  - RTC 6 mos for f/u or PRN    Follow Up {Instructions Charge Capture  Follow-up Communications :23}   Return in 6 months (on 1/6/2023), or if symptoms worsen or fail to improve, for " Recheck.  Patient was given instructions and counseling regarding her condition or for health maintenance advice. Please see specific information pulled into the AVS if appropriate            .  This document has been electronically signed by SARA Henriquez on July 6, 2022 23:09 CDT

## 2022-07-07 ENCOUNTER — LAB (OUTPATIENT)
Dept: LAB | Facility: HOSPITAL | Age: 62
End: 2022-07-07

## 2022-07-07 DIAGNOSIS — Z13.29 THYROID DISORDER SCREEN: ICD-10-CM

## 2022-07-07 DIAGNOSIS — E78.5 HYPERLIPIDEMIA, UNSPECIFIED HYPERLIPIDEMIA TYPE: ICD-10-CM

## 2022-07-07 DIAGNOSIS — Z13.6 SCREENING FOR CARDIOVASCULAR CONDITION: ICD-10-CM

## 2022-07-07 DIAGNOSIS — E55.9 VITAMIN D DEFICIENCY: ICD-10-CM

## 2022-07-07 LAB
25(OH)D3 SERPL-MCNC: 53.9 NG/ML (ref 30–100)
ALBUMIN SERPL-MCNC: 4.4 G/DL (ref 3.5–5.2)
ALBUMIN/GLOB SERPL: 1.3 G/DL
ALP SERPL-CCNC: 150 U/L (ref 39–117)
ALT SERPL W P-5'-P-CCNC: 39 U/L (ref 1–33)
ANION GAP SERPL CALCULATED.3IONS-SCNC: 12.2 MMOL/L (ref 5–15)
AST SERPL-CCNC: 56 U/L (ref 1–32)
BACTERIA UR QL AUTO: NORMAL /HPF
BILIRUB SERPL-MCNC: 0.4 MG/DL (ref 0–1.2)
BILIRUB UR QL STRIP: NEGATIVE
BUN SERPL-MCNC: 9 MG/DL (ref 8–23)
BUN/CREAT SERPL: 11.8 (ref 7–25)
CALCIUM SPEC-SCNC: 10.2 MG/DL (ref 8.6–10.5)
CHLORIDE SERPL-SCNC: 100 MMOL/L (ref 98–107)
CHOLEST SERPL-MCNC: 187 MG/DL (ref 0–200)
CLARITY UR: CLEAR
CO2 SERPL-SCNC: 24.8 MMOL/L (ref 22–29)
COLOR UR: YELLOW
CREAT SERPL-MCNC: 0.76 MG/DL (ref 0.57–1)
DEPRECATED RDW RBC AUTO: 40.9 FL (ref 37–54)
EGFRCR SERPLBLD CKD-EPI 2021: 87.6 ML/MIN/1.73
ERYTHROCYTE [DISTWIDTH] IN BLOOD BY AUTOMATED COUNT: 12.8 % (ref 12.3–15.4)
GLOBULIN UR ELPH-MCNC: 3.5 GM/DL
GLUCOSE SERPL-MCNC: 125 MG/DL (ref 65–99)
GLUCOSE UR STRIP-MCNC: NEGATIVE MG/DL
HCT VFR BLD AUTO: 38.7 % (ref 34–46.6)
HDLC SERPL-MCNC: 85 MG/DL (ref 40–60)
HGB BLD-MCNC: 13.7 G/DL (ref 12–15.9)
HGB UR QL STRIP.AUTO: NEGATIVE
HYALINE CASTS UR QL AUTO: NORMAL /LPF
KETONES UR QL STRIP: NEGATIVE
LDLC SERPL CALC-MCNC: 83 MG/DL (ref 0–100)
LDLC/HDLC SERPL: 0.94 {RATIO}
LEUKOCYTE ESTERASE UR QL STRIP.AUTO: ABNORMAL
MCH RBC QN AUTO: 31.5 PG (ref 26.6–33)
MCHC RBC AUTO-ENTMCNC: 35.4 G/DL (ref 31.5–35.7)
MCV RBC AUTO: 89 FL (ref 79–97)
NITRITE UR QL STRIP: NEGATIVE
PH UR STRIP.AUTO: 6.5 [PH] (ref 5–8)
PLATELET # BLD AUTO: 315 10*3/MM3 (ref 140–450)
PMV BLD AUTO: 11.2 FL (ref 6–12)
POTASSIUM SERPL-SCNC: 4.9 MMOL/L (ref 3.5–5.2)
PROT SERPL-MCNC: 7.9 G/DL (ref 6–8.5)
PROT UR QL STRIP: NEGATIVE
RBC # BLD AUTO: 4.35 10*6/MM3 (ref 3.77–5.28)
RBC # UR STRIP: NORMAL /HPF
REF LAB TEST METHOD: NORMAL
SODIUM SERPL-SCNC: 137 MMOL/L (ref 136–145)
SP GR UR STRIP: 1.01 (ref 1–1.03)
SQUAMOUS #/AREA URNS HPF: NORMAL /HPF
T4 FREE SERPL-MCNC: 1.1 NG/DL (ref 0.93–1.7)
TRIGL SERPL-MCNC: 112 MG/DL (ref 0–150)
TSH SERPL DL<=0.05 MIU/L-ACNC: 3.84 UIU/ML (ref 0.27–4.2)
UROBILINOGEN UR QL STRIP: ABNORMAL
VLDLC SERPL-MCNC: 19 MG/DL (ref 5–40)
WBC # UR STRIP: NORMAL /HPF
WBC NRBC COR # BLD: 7.49 10*3/MM3 (ref 3.4–10.8)

## 2022-07-07 PROCEDURE — 84439 ASSAY OF FREE THYROXINE: CPT

## 2022-07-07 PROCEDURE — 80061 LIPID PANEL: CPT

## 2022-07-07 PROCEDURE — 82306 VITAMIN D 25 HYDROXY: CPT

## 2022-07-07 PROCEDURE — 80050 GENERAL HEALTH PANEL: CPT

## 2022-07-07 PROCEDURE — 81001 URINALYSIS AUTO W/SCOPE: CPT

## 2022-07-08 LAB
QT INTERVAL: 380 MS
QTC INTERVAL: 435 MS

## 2022-07-14 DIAGNOSIS — R74.8 ELEVATED LIVER ENZYMES: Primary | ICD-10-CM

## 2022-08-24 ENCOUNTER — OFFICE VISIT (OUTPATIENT)
Dept: OBSTETRICS AND GYNECOLOGY | Facility: CLINIC | Age: 62
End: 2022-08-24

## 2022-08-24 VITALS
WEIGHT: 172 LBS | DIASTOLIC BLOOD PRESSURE: 80 MMHG | BODY MASS INDEX: 29.37 KG/M2 | HEIGHT: 64 IN | SYSTOLIC BLOOD PRESSURE: 120 MMHG

## 2022-08-24 DIAGNOSIS — Z01.419 ENCOUNTER FOR GYNECOLOGICAL EXAMINATION WITHOUT ABNORMAL FINDING: Primary | ICD-10-CM

## 2022-08-24 DIAGNOSIS — N95.2 POST-MENOPAUSAL ATROPHIC VAGINITIS: ICD-10-CM

## 2022-08-24 PROCEDURE — 99396 PREV VISIT EST AGE 40-64: CPT | Performed by: NURSE PRACTITIONER

## 2022-08-24 RX ORDER — CONJUGATED ESTROGENS 0.62 MG/G
CREAM VAGINAL
Qty: 30 G | Refills: 12 | Status: SHIPPED | OUTPATIENT
Start: 2022-08-24 | End: 2023-03-29

## 2022-08-24 NOTE — PROGRESS NOTES
Subjective   Mary Borges is a 62 y.o. here for annual exam. No concerns at this time.    LMP- 5/2014  Last pap- 5/2/19 NIL neg hrHPV @ [facility]  Last mammo- 8/18/22 @ [facility]    Gynecologic Exam  The patient's primary symptoms include genital itching. The patient's pertinent negatives include no genital lesions, genital odor, genital rash, pelvic pain, vaginal bleeding or vaginal discharge. This is a chronic problem. The current episode started more than 1 year ago. The problem occurs intermittently. The problem has been waxing and waning. The patient is experiencing no pain. Pertinent negatives include no abdominal pain, chills, constipation, diarrhea, dysuria, fever, frequency or urgency. Nothing aggravates the symptoms. Treatments tried: Vagisil. The treatment provided moderate relief. She is not sexually active. She is postmenopausal.       The following portions of the patient's history were reviewed and updated as appropriate: allergies, current medications, past family history, past medical history, past social history, past surgical history and problem list.    Review of Systems   Constitutional: Negative for activity change, appetite change, chills, diaphoresis, fatigue, fever, unexpected weight gain and unexpected weight loss.   Respiratory: Negative for chest tightness and shortness of breath.    Cardiovascular: Negative for chest pain and palpitations.   Gastrointestinal: Negative for abdominal distention, abdominal pain, constipation and diarrhea.   Genitourinary: Negative for breast discharge, breast lump, breast pain, decreased urine volume, difficulty urinating, dysuria, frequency, pelvic pain, pelvic pressure, urgency, urinary incontinence, vaginal bleeding, vaginal discharge and vaginal pain.        Recurrent UTIs; takes daily prophylactic abx   Musculoskeletal: Negative for myalgias.   Skin: Negative for color change, dry skin and skin lesions.   Neurological: Negative for light-headedness and  headache.   Psychiatric/Behavioral: Negative for agitation, dysphoric mood, sleep disturbance, depressed mood and stress. The patient is not nervous/anxious.        Objective   Physical Exam  Vitals and nursing note reviewed.   Constitutional:       General: She is awake. She is not in acute distress.     Appearance: Normal appearance. She is well-developed and well-groomed. She is not ill-appearing, toxic-appearing or diaphoretic.   Neck:      Thyroid: No thyroid mass, thyromegaly or thyroid tenderness.   Cardiovascular:      Rate and Rhythm: Normal rate and regular rhythm.      Heart sounds: Normal heart sounds.   Pulmonary:      Effort: Pulmonary effort is normal.      Breath sounds: Normal breath sounds.   Chest:   Breasts:      Kwame Score is 5. Breasts are symmetrical.      Right: Normal. No swelling, bleeding, inverted nipple, mass, nipple discharge, skin change, tenderness, axillary adenopathy or supraclavicular adenopathy.      Left: Normal. No swelling, bleeding, inverted nipple, mass, nipple discharge, skin change, tenderness, axillary adenopathy or supraclavicular adenopathy.       Abdominal:      General: Bowel sounds are normal. There is no distension.      Palpations: Abdomen is soft.      Tenderness: There is no abdominal tenderness.   Genitourinary:     General: Normal vulva.      Exam position: Lithotomy position.      Kwame stage (genital): 5.      Labia:         Right: No rash, tenderness, lesion or injury.         Left: No rash, tenderness, lesion or injury.       Urethra: No prolapse, urethral pain, urethral swelling or urethral lesion.      Vagina: No signs of injury and foreign body. Erythema and tenderness present. No vaginal discharge, bleeding, lesions or prolapsed vaginal walls.      Cervix: Normal.      Uterus: Normal.       Adnexa: Right adnexa normal and left adnexa normal.        Right: No mass, tenderness or fullness.          Left: No mass, tenderness or fullness.        Comments:  Severe vaginal atrophy and labia architecture loss noted. Pap obtained. Ovaries not palpable.  Lymphadenopathy:      Upper Body:      Right upper body: No supraclavicular, axillary or pectoral adenopathy.      Left upper body: No supraclavicular, axillary or pectoral adenopathy.      Lower Body: No right inguinal adenopathy. No left inguinal adenopathy.   Skin:     General: Skin is warm and dry.   Neurological:      Mental Status: She is alert and oriented to person, place, and time.      Gait: Gait is intact.   Psychiatric:         Attention and Perception: Attention and perception normal.         Mood and Affect: Mood and affect normal.         Speech: Speech normal.         Behavior: Behavior normal. Behavior is cooperative.           Assessment & Plan   Diagnoses and all orders for this visit:    1. Encounter for gynecological examination without abnormal finding (Primary)  -     LIQUID-BASED PAP SMEAR, [facility] ([facility])    2. Post-menopausal atrophic vaginitis    Other orders  -     conjugated estrogens (Premarin) 0.625 MG/GM vaginal cream; Insert 0.5g vaginally at bedtime 2 nights per week.  Dispense: 30 g; Refill: 12      Reviewed cervical and breast cancer screening recommendations. RBA and potential s/e of vaginal cream reviewed.

## 2022-08-25 ENCOUNTER — TELEPHONE (OUTPATIENT)
Dept: FAMILY MEDICINE CLINIC | Facility: CLINIC | Age: 62
End: 2022-08-25

## 2022-08-25 NOTE — TELEPHONE ENCOUNTER
----- Message from SARA Henriquez sent at 8/24/2022 10:35 PM CDT -----  Regarding: Mammogram Result  Please notify pt that her mammogram was normal - no evidence of malignancy. Repeat 1-2 years.

## 2022-08-29 LAB — REF LAB TEST METHOD: NORMAL

## 2022-09-07 DIAGNOSIS — Z12.31 ENCOUNTER FOR SCREENING MAMMOGRAM FOR MALIGNANT NEOPLASM OF BREAST: ICD-10-CM

## 2022-12-28 ENCOUNTER — OFFICE VISIT (OUTPATIENT)
Dept: FAMILY MEDICINE CLINIC | Facility: CLINIC | Age: 62
End: 2022-12-28

## 2022-12-28 VITALS
TEMPERATURE: 97.7 F | BODY MASS INDEX: 29.02 KG/M2 | HEART RATE: 82 BPM | WEIGHT: 170 LBS | OXYGEN SATURATION: 98 % | HEIGHT: 64 IN | SYSTOLIC BLOOD PRESSURE: 110 MMHG | DIASTOLIC BLOOD PRESSURE: 60 MMHG

## 2022-12-28 DIAGNOSIS — Z76.89 ENCOUNTER TO ESTABLISH CARE: ICD-10-CM

## 2022-12-28 DIAGNOSIS — N39.0 RECURRENT UTI: Primary | ICD-10-CM

## 2022-12-28 DIAGNOSIS — E11.65 TYPE 2 DIABETES MELLITUS WITH HYPERGLYCEMIA, WITHOUT LONG-TERM CURRENT USE OF INSULIN: ICD-10-CM

## 2022-12-28 DIAGNOSIS — E78.5 HYPERLIPIDEMIA, UNSPECIFIED HYPERLIPIDEMIA TYPE: ICD-10-CM

## 2022-12-28 PROCEDURE — 99215 OFFICE O/P EST HI 40 MIN: CPT

## 2022-12-28 RX ORDER — ATORVASTATIN CALCIUM 20 MG/1
20 TABLET, FILM COATED ORAL NIGHTLY
Qty: 30 TABLET | Refills: 11 | Status: SHIPPED | OUTPATIENT
Start: 2022-12-28

## 2022-12-28 RX ORDER — DOXYCYCLINE 100 MG/1
100 CAPSULE ORAL DAILY
Qty: 30 CAPSULE | Refills: 0 | Status: SHIPPED | OUTPATIENT
Start: 2022-12-28

## 2022-12-28 NOTE — PROGRESS NOTES
Chief Complaint  Establish Care    Subjective          Mary Borges presents to [facility] - [place]    Past Medical History:   Diagnosis Date   • Diabetes mellitus (HCC)    • Hyperlipidemia    • Plantar fasciitis       Past Surgical History:   Procedure Laterality Date   • CARDIAC ELECTROPHYSIOLOGY PROCEDURE N/A 2019    Procedure: EP/Ablation;  Surgeon: Sony Morgan MD;  Location: [facility] INVASIVE LOCATION;  Service: Cardiology   • CARDIAC SURGERY      Ablation   •  SECTION     • TUBAL ABDOMINAL LIGATION  049        History of Present Illness  62-year-old female presents to office to establish care and requesting medication refills for treatment of hyperlipidemia, T2DM, and recurrent UTIs.  Patient states she has had frequent UTIs since menopause, and has been seen by urology.  Patient states she was instructed to attempt to wean off maintenance/prophylactic doxycycline.  After stopping doxycycline for several months UTIs returned and she has since restarted doxycycline 100 mg daily.  She states she has been taking medication for approximately 3 months now.  Scheduled to see urology in 1 month, requesting refill of doxycycline until appointment with urology.  Denies s/s of UTI currently.    Patient also requesting medication refill for metformin for T2DM, last A1c 7.9, recheck due on 23. Denies s/s of hyper/hypoglycemia. Pt reports following diabetic diet majority of the time, examines feet routinely, takes medications as prescribed.  Refill for atorvastatin requested, last check of lipid panel 22 normal. No s/e from medication noted, reports compliance with medication administration.      Review of Systems   Constitutional: Negative.    HENT: Negative.    Eyes: Negative.    Respiratory: Negative.  Negative for shortness of breath.    Cardiovascular: Negative.  Negative for chest pain, palpitations and leg swelling.  "  Gastrointestinal: Negative.  Negative for constipation and diarrhea.   Endocrine: Negative.    Genitourinary: Negative for dysuria, flank pain and frequency.        Recurrent UTIs without doxycycline.   Musculoskeletal: Negative.    Skin: Negative.    Allergic/Immunologic: Negative.    Neurological: Negative.    Hematological: Negative.    Psychiatric/Behavioral: Negative.         Objective   Vital Signs:   /60 (BP Location: Left arm, Patient Position: Sitting, Cuff Size: Large Adult)   Pulse 82   Temp 97.7 °F (36.5 °C)   Ht 162.6 cm (64\")   Wt 77.1 kg (170 lb)   SpO2 98%   BMI 29.18 kg/m²       Physical Exam  Vitals reviewed.   Constitutional:       General: She is not in acute distress.     Appearance: Normal appearance. She is not ill-appearing.      Comments: BMI: 29.18   HENT:      Right Ear: Tympanic membrane, ear canal and external ear normal.      Left Ear: Tympanic membrane, ear canal and external ear normal.      Nose: Nose normal.      Mouth/Throat:      Mouth: Mucous membranes are moist.      Pharynx: Oropharynx is clear.   Eyes:      General:         Right eye: No discharge.         Left eye: No discharge.      Pupils: Pupils are equal, round, and reactive to light.   Cardiovascular:      Rate and Rhythm: Normal rate and regular rhythm.      Pulses:           Dorsalis pedis pulses are 2+ on the right side and 2+ on the left side.        Posterior tibial pulses are 2+ on the right side and 2+ on the left side.      Heart sounds: Normal heart sounds.   Pulmonary:      Effort: Pulmonary effort is normal. No respiratory distress.      Breath sounds: Normal breath sounds. No stridor. No wheezing, rhonchi or rales.   Chest:      Chest wall: No tenderness.   Abdominal:      General: Abdomen is flat. Bowel sounds are normal. There is no distension.      Palpations: Abdomen is soft. There is no mass.      Tenderness: There is no abdominal tenderness.      Hernia: No hernia is present. "   Musculoskeletal:         General: Normal range of motion.      Cervical back: Normal range of motion and neck supple. No tenderness.      Right foot: Normal range of motion. No deformity or bunion.      Left foot: Normal range of motion. No deformity or bunion.   Feet:      Right foot:      Protective Sensation: 6 sites tested. 6 sites sensed.      Skin integrity: Skin integrity normal.      Toenail Condition: Right toenails are normal.      Left foot:      Protective Sensation: 6 sites tested. 6 sites sensed.      Skin integrity: Skin integrity normal.      Toenail Condition: Left toenails are normal.   Lymphadenopathy:      Cervical: No cervical adenopathy.   Neurological:      General: No focal deficit present.      Mental Status: She is alert and oriented to person, place, and time. Mental status is at baseline.      Gait: Gait normal.   Psychiatric:         Mood and Affect: Mood normal.         Behavior: Behavior normal.         Thought Content: Thought content normal.         Judgment: Judgment normal.          Result Review :     Common labs    Common Labs 1/4/22 1/4/22 1/4/22 7/6/22 7/7/22 7/7/22 7/7/22    0826 0826 0826  0813 0813 0813   Glucose      125 (A)    BUN      9    Creatinine      0.76    Sodium      137    Potassium      4.9    Chloride      100    Calcium      10.2    Albumin      4.40    Total Bilirubin      0.4    Alkaline Phosphatase      150 (A)    AST (SGOT)      56 (A)    ALT (SGPT)      39 (A)    WBC     7.49     Hemoglobin     13.7     Hematocrit     38.7     Platelets     315     Total Cholesterol  204 (A)     187   Triglycerides  127     112   HDL Cholesterol  84 (A)     85 (A)   LDL Cholesterol   98     83   Hemoglobin A1C 8.67 (A)   7.9      Microalbumin, Urine   <1.2       (A) Abnormal value                   Assessment and Plan    Diagnoses and all orders for this visit:    1. Recurrent UTI (Primary)  -     doxycycline (MONODOX) 100 MG capsule; Take 1 capsule by mouth Daily.   Dispense: 30 capsule; Refill: 0  - Keep follow up with urology as scheduled for further advice.    2. Hyperlipidemia, unspecified hyperlipidemia type  -     atorvastatin (LIPITOR) 20 MG tablet; Take 1 tablet by mouth Every Night.  Dispense: 30 tablet; Refill: 11  The 10-year ASCVD risk score (Candido GARZA, et al., 2019) is: 5.6%    Values used to calculate the score:      Age: 62 years      Sex: Female      Is Non- : No      Diabetic: Yes      Tobacco smoker: No      Systolic Blood Pressure: 110 mmHg      Is BP treated: No      HDL Cholesterol: 85 mg/dL      Total Cholesterol: 187 mg/dL  - Lipid recheck due in July 2023    3. Type 2 diabetes mellitus with hyperglycemia, without long-term current use of insulin (HCC)  -     metFORMIN (GLUCOPHAGE) 1000 MG tablet; Take 1 tablet by mouth 2 (Two) Times a Day With Meals.  Dispense: 60 tablet; Refill: 5  -     MicroAlbumin, Urine, Random - Urine, Clean Catch; Future  -     Hemoglobin A1c; Future  -     Comprehensive metabolic panel; Future  -Continue diabetic diet and routine self exam of feet.  -declined podiatry diabetic foot exam.    4. Encounter to establish care      -Discussed plan of care with patient.    -Answered all of patient's questions.  -Patient agreeable to plan of care.      I spent 42 minutes caring for Mary on this date of service. This time includes time spent by me in the following activities:preparing for the visit, reviewing tests, performing a medically appropriate examination and/or evaluation , counseling and educating the patient/family/caregiver, ordering medications, tests, or procedures and documenting information in the medical record  Follow Up   Return in about 3 months (around 3/28/2023) for Recheck.  Patient was given instructions and counseling regarding her condition or for health maintenance advice. Please see specific information pulled into the AVS if appropriate.         This document has been electronically  signed by SARA Eisenberg on December 29, 2022 03:04 CST,.

## 2023-01-10 ENCOUNTER — LAB (OUTPATIENT)
Dept: LAB | Facility: HOSPITAL | Age: 63
End: 2023-01-10
Payer: MEDICARE

## 2023-01-10 DIAGNOSIS — E11.65 TYPE 2 DIABETES MELLITUS WITH HYPERGLYCEMIA, WITHOUT LONG-TERM CURRENT USE OF INSULIN: ICD-10-CM

## 2023-01-10 LAB
ALBUMIN SERPL-MCNC: 4.1 G/DL (ref 3.5–5.2)
ALBUMIN UR-MCNC: <1.2 MG/DL
ALBUMIN/GLOB SERPL: 1 G/DL
ALP SERPL-CCNC: 137 U/L (ref 39–117)
ALT SERPL W P-5'-P-CCNC: 35 U/L (ref 1–33)
ANION GAP SERPL CALCULATED.3IONS-SCNC: 10.3 MMOL/L (ref 5–15)
AST SERPL-CCNC: 46 U/L (ref 1–32)
BILIRUB SERPL-MCNC: 0.4 MG/DL (ref 0–1.2)
BUN SERPL-MCNC: 10 MG/DL (ref 8–23)
BUN/CREAT SERPL: 10.3 (ref 7–25)
CALCIUM SPEC-SCNC: 10.2 MG/DL (ref 8.6–10.5)
CHLORIDE SERPL-SCNC: 99 MMOL/L (ref 98–107)
CO2 SERPL-SCNC: 28.7 MMOL/L (ref 22–29)
CREAT SERPL-MCNC: 0.97 MG/DL (ref 0.57–1)
EGFRCR SERPLBLD CKD-EPI 2021: 65.4 ML/MIN/1.73
GLOBULIN UR ELPH-MCNC: 4 GM/DL
GLUCOSE SERPL-MCNC: 157 MG/DL (ref 65–99)
HBA1C MFR BLD: 7.8 % (ref 4.8–5.6)
POTASSIUM SERPL-SCNC: 4.5 MMOL/L (ref 3.5–5.2)
PROT SERPL-MCNC: 8.1 G/DL (ref 6–8.5)
SODIUM SERPL-SCNC: 138 MMOL/L (ref 136–145)

## 2023-01-10 PROCEDURE — 83036 HEMOGLOBIN GLYCOSYLATED A1C: CPT

## 2023-01-10 PROCEDURE — 82043 UR ALBUMIN QUANTITATIVE: CPT

## 2023-01-10 PROCEDURE — 80053 COMPREHEN METABOLIC PANEL: CPT

## 2023-01-20 ENCOUNTER — TRANSCRIBE ORDERS (OUTPATIENT)
Dept: LAB | Facility: HOSPITAL | Age: 63
End: 2023-01-20
Payer: MEDICARE

## 2023-01-20 ENCOUNTER — LAB (OUTPATIENT)
Dept: LAB | Facility: HOSPITAL | Age: 63
End: 2023-01-20
Payer: MEDICARE

## 2023-01-20 DIAGNOSIS — N39.0 URINARY TRACT INFECTION WITHOUT HEMATURIA, SITE UNSPECIFIED: ICD-10-CM

## 2023-01-20 DIAGNOSIS — N39.0 URINARY TRACT INFECTION WITHOUT HEMATURIA, SITE UNSPECIFIED: Primary | ICD-10-CM

## 2023-01-20 PROCEDURE — 81001 URINALYSIS AUTO W/SCOPE: CPT

## 2023-01-21 LAB
BACTERIA UR QL AUTO: NORMAL /HPF
BILIRUB UR QL STRIP: NEGATIVE
CLARITY UR: CLEAR
COLOR UR: YELLOW
GLUCOSE UR STRIP-MCNC: NEGATIVE MG/DL
HGB UR QL STRIP.AUTO: NEGATIVE
HYALINE CASTS UR QL AUTO: NORMAL /LPF
KETONES UR QL STRIP: NEGATIVE
LEUKOCYTE ESTERASE UR QL STRIP.AUTO: ABNORMAL
NITRITE UR QL STRIP: NEGATIVE
PH UR STRIP.AUTO: 7 [PH] (ref 5–8)
PROT UR QL STRIP: NEGATIVE
RBC # UR STRIP: NORMAL /HPF
REF LAB TEST METHOD: NORMAL
SP GR UR STRIP: 1.01 (ref 1–1.03)
SQUAMOUS #/AREA URNS HPF: NORMAL /HPF
UROBILINOGEN UR QL STRIP: ABNORMAL
WBC # UR STRIP: NORMAL /HPF

## 2023-03-29 ENCOUNTER — OFFICE VISIT (OUTPATIENT)
Dept: FAMILY MEDICINE CLINIC | Facility: CLINIC | Age: 63
End: 2023-03-29
Payer: MEDICARE

## 2023-03-29 VITALS
HEIGHT: 64 IN | SYSTOLIC BLOOD PRESSURE: 130 MMHG | HEART RATE: 85 BPM | WEIGHT: 169 LBS | BODY MASS INDEX: 28.85 KG/M2 | OXYGEN SATURATION: 99 % | DIASTOLIC BLOOD PRESSURE: 70 MMHG | TEMPERATURE: 96.8 F

## 2023-03-29 DIAGNOSIS — N39.0 RECURRENT UTI: ICD-10-CM

## 2023-03-29 DIAGNOSIS — E11.65 TYPE 2 DIABETES MELLITUS WITH HYPERGLYCEMIA, WITHOUT LONG-TERM CURRENT USE OF INSULIN: Primary | ICD-10-CM

## 2023-03-29 DIAGNOSIS — E78.5 HYPERLIPIDEMIA, UNSPECIFIED HYPERLIPIDEMIA TYPE: ICD-10-CM

## 2023-03-29 DIAGNOSIS — R29.898 WEAKNESS OF BOTH LOWER EXTREMITIES: ICD-10-CM

## 2023-03-30 NOTE — PROGRESS NOTES
Chief Complaint  Follow-up    Subjective          Mary Borges presents to [facility] - [place]    Past Medical History:   Diagnosis Date   • Diabetes mellitus (HCC)    • Hyperlipidemia    • Plantar fasciitis       Past Surgical History:   Procedure Laterality Date   • CARDIAC ELECTROPHYSIOLOGY PROCEDURE N/A 2019    Procedure: EP/Ablation;  Surgeon: Sony Morgan MD;  Location: [facility] INVASIVE LOCATION;  Service: Cardiology   • CARDIAC SURGERY      Ablation   •  SECTION     • TUBAL ABDOMINAL LIGATION  0491        Current Outpatient Medications   Medication Instructions   • aspirin 81 mg, Oral, Daily   • atorvastatin (LIPITOR) 20 mg, Oral, Nightly   • cetirizine (ZYRTEC) 10 mg, Oral, Daily   • doxycycline (MONODOX) 100 mg, Oral, Daily   • metFORMIN (GLUCOPHAGE) 1,000 mg, Oral, 2 Times Daily With Meals   • Multiple Vitamins-Minerals (CENTRUM SILVER 50+WOMEN PO) Oral   • Potassium 99 MG tablet 1 tablet, Oral      No Known Allergies     History of Present Illness  Mary Borges presents office for 3-month follow-up.  Patient reports bilateral lower extremity weakness intermittently.  Patient states falls less frequent however balance is still impaired to some degree.  Patient often uses cane, or stays near hand railing.  Patient's last imaging of lumbar spine in 2021: That reveals spondylolisthesis of L5 and facet arthrosis and pars defects as well as disc base narrowing between L5 and S1 facet arthrosis also noted at L4-L5.  Patient is not interested in outpatient physical therapy, however would be interested in at home exercises to strengthen back and legs.    History of recurrent UTIs.  Patient takes doxycycline prophylactically.  Patient states since restart of doxycycline with last visit no signs and symptoms of UTI.  Denies flank pain, dysuria, fever and or chills.  Compliance with medication.    History of hyperlipidemia.   "Patient currently taking atorvastatin 20 mg nightly.  Patient denies any side effects with medications including myalgias.  Patient's last lipid level drawn in July 2022.  Due for next lipid level in July 2023.  Compliance with taking medications noted.  Diabetes  She presents for her follow-up diabetic visit. She has type 2 diabetes mellitus. There are no hypoglycemic associated symptoms. Pertinent negatives for diabetes include no blurred vision, no chest pain and no foot paresthesias. There are no hypoglycemic complications. Symptoms are stable. There are no diabetic complications. Risk factors for coronary artery disease include dyslipidemia and diabetes mellitus. Current diabetic treatment includes oral agent (monotherapy) and diet. She is compliant with treatment most of the time. There is no change in her home blood glucose trend.       Review of Systems   Constitutional: Negative.  Negative for chills and fever.   HENT: Negative.    Eyes: Negative.  Negative for blurred vision.   Respiratory: Negative.  Negative for shortness of breath.    Cardiovascular: Negative.  Negative for chest pain, palpitations and leg swelling.   Gastrointestinal: Negative.  Negative for constipation and diarrhea.   Endocrine: Negative.    Musculoskeletal: Negative.    Skin: Negative.    Neurological: Negative.    Psychiatric/Behavioral: Negative.         Objective   Vital Signs:   /70 (BP Location: Right arm, Patient Position: Sitting, Cuff Size: Adult)   Pulse 85   Temp 96.8 °F (36 °C)   Ht 162.6 cm (64\")   Wt 76.7 kg (169 lb)   SpO2 99%   BMI 29.01 kg/m²       Physical Exam  Vitals reviewed.   Constitutional:       General: She is not in acute distress.     Appearance: Normal appearance. She is not ill-appearing.      Comments: BMI: 29.01   HENT:      Head: Normocephalic and atraumatic.      Right Ear: Tympanic membrane, ear canal and external ear normal.      Left Ear: Tympanic membrane, ear canal and external ear " normal.      Nose: Nose normal.      Mouth/Throat:      Mouth: Mucous membranes are moist.      Pharynx: Oropharynx is clear.   Eyes:      General:         Right eye: No discharge.         Left eye: No discharge.   Cardiovascular:      Rate and Rhythm: Normal rate and regular rhythm.      Heart sounds: Normal heart sounds.   Pulmonary:      Effort: Pulmonary effort is normal. No respiratory distress.      Breath sounds: Normal breath sounds. No stridor. No wheezing, rhonchi or rales.   Chest:      Chest wall: No tenderness.   Abdominal:      General: There is no distension.      Palpations: Abdomen is soft.   Musculoskeletal:         General: Normal range of motion.      Cervical back: Normal range of motion and neck supple. No tenderness.      Right lower leg: No edema.      Left lower leg: No edema.   Lymphadenopathy:      Cervical: No cervical adenopathy.   Neurological:      General: No focal deficit present.      Mental Status: She is alert and oriented to person, place, and time. Mental status is at baseline.      Motor: Weakness present.   Psychiatric:         Mood and Affect: Mood normal.         Behavior: Behavior normal.         Thought Content: Thought content normal.         Judgment: Judgment normal.          Result Review :     Common labs    Common Labs 7/6/22 7/7/22 7/7/22 7/7/22 1/10/23 1/10/23 1/10/23     0813 0813 0813 0802 0802 0802   Glucose   125 (A)  157 (A)     BUN   9  10     Creatinine   0.76  0.97     Sodium   137  138     Potassium   4.9  4.5     Chloride   100  99     Calcium   10.2  10.2     Albumin   4.40  4.1     Total Bilirubin   0.4  0.4     Alkaline Phosphatase   150 (A)  137 (A)     AST (SGOT)   56 (A)  46 (A)     ALT (SGPT)   39 (A)  35 (A)     WBC  7.49        Hemoglobin  13.7        Hematocrit  38.7        Platelets  315        Total Cholesterol    187      Triglycerides    112      HDL Cholesterol    85 (A)      LDL Cholesterol     83      Hemoglobin A1C 7.9     7.80 (A)     Microalbumin, Urine       <1.2   (A) Abnormal value          Lipid level done in 07/2023, A1c due on 04/10/2023    Data reviewed: Radiologic studies L-spine from 06/2021     XR Spine Lumbar 4+ View [[id number]] (Order [id number])    Narrative & Impression   Procedure:  Lumbar spine         Indication:  Bilateral lower extremity weakness.   .     Technique:  5 views   .     Prior relevant exam:  None.     Disc space narrowing at L5-S1.     There is a 0.5 cm anterior spondylolisthesis of L5, with respect  to S1. This is secondary to facet arthrosis and pars defects,  spondylolysis at L5.     Facet arthrosis is also noted at L4-L5 especially on the right.     Lumbar spine is otherwise unremarkable.  Incidentally noted are multiple calcified gallstones in the right  upper quadrant.     IMPRESSION:  0.5 cm anterior spondylolisthesis of L5 with respect  to S1 secondary to facet arthrosis and pars defects,  spondylolysis at L5. Disc space narrowing L5-S1.     Facet arthrosis is also noted at L4-L5.     Calcified gallstones right upper quadrant.     Electronically signed by:  Jaycob Gil MD  6/24/2021 12:00 PM CDT  Workstation: [id number]          Assessment and Plan    Diagnoses and all orders for this visit:    1. Type 2 diabetes mellitus with hyperglycemia, without long-term current use of insulin (HCC) (Primary)  -     Hemoglobin A1c; Future    2. Hyperlipidemia, unspecified hyperlipidemia type    3. Recurrent UTI    4. Weakness of both lower extremities    *Repeat A1C on or after 04/10/2023.  Continue with current dose of metformin at this time.  If A1c remains elevated will consider dual therapy with either GLP-1 or SGTL2.  *Lipid level due in 07/2023.  *Continue with prophylactic doxycycline.  *Provide education on physical therapy exercises that can be performed at home for low back and lower extremity weakness.  *Declines outpatient physical therapy at this time.  If weakness increases or if balance worsens will  consider outpatient physical therapy at that time.    -Discussed plan of care with patient.    -Answered all of patient's questions.  -Patient agreeable to plan of care.    EMR Dragon/Transcription Disclaimer: Some of this note may be an electronic transcription/translation of spoken language to printed text.  The electronic translation of spoken language may permit erroneous, or at times, nonsensical words or phrases to be inadvertently transcribed. Although I have reviewed the note for such errors, some may still exist.     Follow Up   Return in about 15 weeks (around 7/12/2023) for Follow up visit with fasting labs.  Patient was given instructions and counseling regarding her condition or for health maintenance advice. Please see specific information pulled into the AVS if appropriate.       This document has been electronically signed by SARA Eisenberg on March 30, 2023 06:10 CDT,.

## 2023-06-01 ENCOUNTER — TELEPHONE (OUTPATIENT)
Dept: FAMILY MEDICINE CLINIC | Facility: CLINIC | Age: 63
End: 2023-06-01

## 2023-06-01 NOTE — TELEPHONE ENCOUNTER
Patient would like a call back from SARA Pack in regards to her heart.  Call back number: [phone]

## 2023-06-02 NOTE — TELEPHONE ENCOUNTER
Patient said she was having svt's since that ablation and she called the dr that did the ablation and he told her she needs a referral cause its been so long since shes been seen so she was calling for you to give her a referral for a cardiologist in [place], she did call the ambulance and they came out and checked her and did ekg she said it didn't show anything which she said she knew it wouldn't but she said she wants to go see a specialist cause she said she dont want to see a regular dr.

## 2023-06-06 DIAGNOSIS — I47.1 SVT (SUPRAVENTRICULAR TACHYCARDIA): ICD-10-CM

## 2023-06-06 DIAGNOSIS — R00.2 PALPITATIONS: Primary | ICD-10-CM

## 2023-06-15 DIAGNOSIS — E11.65 TYPE 2 DIABETES MELLITUS WITH HYPERGLYCEMIA, WITHOUT LONG-TERM CURRENT USE OF INSULIN: ICD-10-CM

## 2023-09-05 ENCOUNTER — OFFICE VISIT (OUTPATIENT)
Dept: CARDIOLOGY | Facility: CLINIC | Age: 63
End: 2023-09-05
Payer: MEDICARE

## 2023-09-05 VITALS
SYSTOLIC BLOOD PRESSURE: 130 MMHG | BODY MASS INDEX: 28.89 KG/M2 | HEIGHT: 64 IN | WEIGHT: 169.2 LBS | DIASTOLIC BLOOD PRESSURE: 82 MMHG | HEART RATE: 92 BPM | OXYGEN SATURATION: 96 %

## 2023-09-05 DIAGNOSIS — R00.2 PALPITATIONS: Primary | ICD-10-CM

## 2023-09-05 DIAGNOSIS — I47.1 PAROXYSMAL SVT (SUPRAVENTRICULAR TACHYCARDIA): ICD-10-CM

## 2023-09-05 DIAGNOSIS — R07.89 CHEST PRESSURE: ICD-10-CM

## 2023-09-05 PROCEDURE — 1160F RVW MEDS BY RX/DR IN RCRD: CPT | Performed by: INTERNAL MEDICINE

## 2023-09-05 PROCEDURE — 1159F MED LIST DOCD IN RCRD: CPT | Performed by: INTERNAL MEDICINE

## 2023-09-05 PROCEDURE — 99204 OFFICE O/P NEW MOD 45 MIN: CPT | Performed by: INTERNAL MEDICINE

## 2023-09-05 RX ORDER — METOPROLOL TARTRATE 50 MG/1
50 TABLET, FILM COATED ORAL 2 TIMES DAILY
Qty: 180 TABLET | Refills: 0 | Status: SHIPPED | OUTPATIENT
Start: 2023-09-05

## 2023-09-05 NOTE — PROGRESS NOTES
[facility] Cardiology  OFFICE NOTE    Cardiovascular Medicine  Frantz Rey M.D., [facility]         Tatyana Marie, APRN  [address] [place] [address]    Thank you for asking me to see Mary Borges for palpitations.    History of Present Illness    Mary Borges is a 63 y.o. female who presents for consultation today.   She is s/p SVT ablation in September 2019.  This was performed by Dr. Morgan.  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  Transthoracic echocardiogram in February 2019 reported LVEF of 61-65%, normal biatrial sizes, normal RV cavity size/systolic function, no hemodynamically significant valvular abnormalities of assessed valves and no evidence of pericardial effusion.  The patient reports significant improvement in her symptoms after ablation for 2 years.  After that, she noticed recurrence of palpitations.  These episodes lasted for approximately 10 minutes at a time and resolved spontaneously.  Recently, she was started on farxiga.  After starting this medication, she noticed prolonged episodes of palpitations that lasted up to several hours at a time.  These episodes were accompanied by a sensation of pressure in the central chest, dyspnea and dizziness.  She denies having any presyncope or syncope.  She has not had any PND, orthopnea or leg swelling.  In addition, she has also had a sensation of her legs giving out intermittently.  She reports taking all medications regularly as prescribed.    Review of Systems - ROS  Constitution: Negative for weight gain and weight loss.   HENT: Negative for congestion.    Eyes: Negative for blurred vision.   Cardiovascular: As mentioned above  Respiratory: Negative for cough and hemoptysis.    Endocrine: Negative for polydipsia and polyuria.   Hematologic/Lymphatic: Negative for bleeding problem. Does not bruise/bleed easily.   Skin: Negative for flushing.    Musculoskeletal: Negative for neck pain and stiffness.   Gastrointestinal: Negative for abdominal pain, nausea and vomiting.   Genitourinary: Negative for dysuria and hematuria.   Neurological: Negative for focal numbness.   Psychiatric/Behavioral: Negative for altered mental status and depression.      All other systems were reviewed and were negative.    Past Medical History:   Diagnosis Date    Diabetes mellitus 2000    Hyperlipidemia     Plantar fasciitis        Family History:  family history includes Cancer in her maternal grandmother and mother; Diabetes in her father and sister; Heart disease in her father.    Social History: She denies current tobacco, alcohol or illicit drug use.  She drinks 2 cups of coffee daily and also drinks Dr. Pepper regularly.   reports that she has never smoked. She has never used smokeless tobacco. She reports that she does not drink alcohol and does not use drugs.    Allergies:  Allergies   Allergen Reactions    Farxiga [Dapagliflozin] Palpitations         Current Outpatient Medications:     aspirin 81 MG chewable tablet, Chew 1 tablet Daily., Disp: , Rfl:     atorvastatin (LIPITOR) 20 MG tablet, Take 1 tablet by mouth Every Night., Disp: 30 tablet, Rfl: 11    cetirizine (zyrTEC) 10 MG tablet, Take 1 tablet by mouth Daily., Disp: , Rfl:     doxycycline (MONODOX) 100 MG capsule, Take 1 capsule by mouth Daily., Disp: 30 capsule, Rfl: 0    metFORMIN (GLUCOPHAGE) 1000 MG tablet, TAKE ONE TABLET BY MOUTH TWICE A DAY WITH A MEAL, Disp: 60 tablet, Rfl: 5    Multiple Vitamins-Minerals (CENTRUM SILVER 50+WOMEN PO), Take  by mouth., Disp: , Rfl:     Potassium 99 MG tablet, Take 1 tablet by mouth., Disp: , Rfl:     dapagliflozin (Farxiga) 5 MG tablet tablet, Take 1 tablet by mouth Daily. (Patient not taking: Reported on 9/5/2023), Disp: 7 tablet, Rfl: 0    metoprolol tartrate (LOPRESSOR) 50 MG tablet, Take 1 tablet by mouth 2 (Two) Times a Day., Disp: 180 tablet, Rfl: 0    Physical  "Exam:  Vitals:    09/05/23 1449   BP: 130/82   BP Location: Right arm   Patient Position: Sitting   Cuff Size: Adult   Pulse: 92   SpO2: 96%   Weight: 76.7 kg (169 lb 3.2 oz)   Height: 162.6 cm (64.02\")   PainSc: 0-No pain     Current Pain Level: none  Pulse Ox: Normal  on room air  General: alert, appears stated age and cooperative     Body Habitus: Overweight  HEENT: Head: Normocephalic, no lesions, without obvious abnormality.   Neuro: alert, oriented x3  JVP: Volume/Pulsation: Normal.  Normal waveforms.   Carotid Exam: no bruit normal pulsation bilaterally   Carotid Volume: normal.     Respirations: no increased work of breathing   Chest:  Normal    Pulmonary:Normal   Heart rate: normal    Heart Rhythm: regular     Heart Sounds: S1: normal  S2: normal  S3: absent      Abdomen:   Appearance: normal .  Palpation: Soft, non-tender to palpation, bowel sounds positive in all four quadrants  Extremity: no edema.       DATA REVIEWED:     EKG. I personally reviewed and interpreted the EKG.  normal sinus rhythm, possible left atrial enlargement, T wave inversions in anteroseptal leads on 7/12/2023    ECG/EMG Results (all)       None          ---------------------------------------------------  TTE/JENA:  Results for orders placed during the hospital encounter of 02/28/19    Adult Transthoracic Echo Complete W/ Cont if Necessary Per Protocol    Interpretation Summary  · Estimated EF = 61%.  · Left ventricular systolic function is normal.  · The tricuspid valve is grossly normal. Trace tricuspid valve regurgitation is present. No evidence of pulmonary hypertension is present.      -----------------------------------------------------  CXR/Imaging:   Imaging Results (Most Recent)       None            -----------------------------------------------------  CT:   No radiology results for the last 30 " days.    ----------------------------------------------------      --------------------------------------------------------------------------------------------------  LABS:     The 10-year CVD risk score (Michael et al., 2008) is: 10.7%    Values used to calculate the score:      Age: 63 years      Sex: Female      Diabetic: Yes      Tobacco smoker: No      Systolic Blood Pressure: 130 mmHg      Is BP treated: No      HDL Cholesterol: 84 mg/dL      Total Cholesterol: 179 mg/dL         Lab Results   Component Value Date    GLUCOSE 140 (H) 07/12/2023    BUN 9 07/12/2023    CREATININE 0.79 07/12/2023    EGFRIFNONA 70 06/24/2021    BCR 11.4 07/12/2023    K 4.2 07/12/2023    CO2 27.0 07/12/2023    CALCIUM 10.5 07/12/2023    ALBUMIN 4.4 07/12/2023    AST 48 (H) 07/12/2023    ALT 37 (H) 07/12/2023     Lab Results   Component Value Date    WBC 7.53 07/12/2023    HGB 13.3 07/12/2023    HCT 39.9 07/12/2023    MCV 89.9 07/12/2023     07/12/2023     Lab Results   Component Value Date    CHOL 179 07/12/2023    TRIG 98 07/12/2023    HDL 84 (H) 07/12/2023    LDL 78 07/12/2023     Lab Results   Component Value Date    TSH 2.760 07/12/2023     Lab Results   Component Value Date    CKTOTAL 59 06/05/2017    CKMB 1.00 06/05/2017    TROPONINI <0.012 06/05/2017     Lab Results   Component Value Date    HGBA1C 7.90 (H) 07/12/2023     No results found for: DDIMER  Lab Results   Component Value Date    ALT 37 (H) 07/12/2023     Lab Results   Component Value Date    HGBA1C 7.90 (H) 07/12/2023    HGBA1C 7.9 07/12/2023    HGBA1C 7.80 (H) 01/10/2023     Lab Results   Component Value Date    MICROALBUR <1.2 07/12/2023    CREATININE 0.79 07/12/2023     Lab Results   Component Value Date    IRON 101 06/24/2021    TIBC 386 06/24/2021     Lab Results   Component Value Date    INR 0.87 09/23/2019    PROTIME 11.7 09/23/2019       [unfilled]    1. Palpitations  - metoprolol tartrate (LOPRESSOR) 50 MG tablet; Take 1 tablet by mouth 2  (Two) Times a Day.  Dispense: 180 tablet; Refill: 0  - Mobile Cardiac Outpatient Telemetry; Future    2. Paroxysmal SVT (supraventricular tachycardia)  - metoprolol tartrate (LOPRESSOR) 50 MG tablet; Take 1 tablet by mouth 2 (Two) Times a Day.  Dispense: 180 tablet; Refill: 0  - Mobile Cardiac Outpatient Telemetry; Future    3. Chest pressure  - metoprolol tartrate (LOPRESSOR) 50 MG tablet; Take 1 tablet by mouth 2 (Two) Times a Day.  Dispense: 180 tablet; Refill: 0  - Stress Test With Myocardial Perfusion One Day; Future    She is s/p SVT ablation in September 2019.  This was performed by Dr. Morgan.  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  Transthoracic echocardiogram in February 2019 reported LVEF of 61-65%, normal biatrial sizes, normal RV cavity size/systolic function, no hemodynamically significant valvular abnormalities of assessed valves and no evidence of pericardial effusion.  The patient reports significant improvement in her symptoms after ablation for 2 years.  After that, she noticed recurrence of palpitations.  These episodes lasted for approximately 10 minutes at a time and resolved spontaneously.  Recently, she was started on farxiga.  After starting this medication, she noticed prolonged episodes of palpitations that lasted up to several hours at a time.  These episodes were accompanied by a sensation of pressure in the central chest, dyspnea and dizziness.   Considering the presence of coronary risk factors including diabetes mellitus and symptoms of ongoing chest pressure, we will proceed with an exercise nuclear stress test for further evaluation.  Continue baby aspirin therapy.  Initiate Lopressor therapy to see if this would improve her symptoms.  Obtain a 30-day live cardiac monitor to look for recurrence of SVT or any other hemodynamically significant arrhythmias.  She was instructed to decrease her caffeine intake.  Serum TSH level was  normal in July 2023.  She reports intermittent sensation of her legs giving out.  She was instructed to hold her atorvastatin for a couple of weeks to see if this would improve her symptoms.  We will consider further evaluation at future visits (from cardiovascular standpoint).  She was instructed to discuss this symptom with her primary care office to explore other etiologies as well.    Prevention:  BMI is >= 25 and <30. (Overweight) The following options were offered after discussion;: referral to primary care      Mary Borges  reports that she has never smoked. She has never used smokeless tobacco..       Return in about 3 months (around 12/5/2023).            Electronically signed by Frantz Rey MD on 09/05/23 at 15:18 CDT

## 2023-09-25 ENCOUNTER — HOSPITAL ENCOUNTER (OUTPATIENT)
Dept: NUCLEAR MEDICINE | Facility: HOSPITAL | Age: 63
Discharge: HOME | End: 2023-09-25
Payer: MEDICARE

## 2023-09-25 ENCOUNTER — HOSPITAL ENCOUNTER (OUTPATIENT)
Dept: CARDIOLOGY | Facility: HOSPITAL | Age: 63
Discharge: HOME | End: 2023-09-25
Payer: MEDICARE

## 2023-09-25 DIAGNOSIS — R07.89 CHEST PRESSURE: ICD-10-CM

## 2023-09-25 LAB
BH CV REST NUCLEAR ISOTOPE DOSE: 10.7 MCI
BH CV STRESS BP STAGE 1: NORMAL
BH CV STRESS DURATION MIN STAGE 1: 3
BH CV STRESS DURATION SEC STAGE 1: 0
BH CV STRESS GRADE STAGE 1: 10
BH CV STRESS HR STAGE 1: 136
BH CV STRESS METS STAGE 1: 5
BH CV STRESS NUCLEAR ISOTOPE DOSE: 33.2 MCI
BH CV STRESS PROTOCOL 1: NORMAL
BH CV STRESS RECOVERY BP: NORMAL MMHG
BH CV STRESS RECOVERY HR: 83 BPM
BH CV STRESS SPEED STAGE 1: 1.7
BH CV STRESS STAGE 1: 1
LV EF NUC BP: 96 %
MAXIMAL PREDICTED HEART RATE: 155 BPM
PERCENT MAX PREDICTED HR: 87.74 %
STRESS BASELINE BP: NORMAL MMHG
STRESS BASELINE HR: 70 BPM
STRESS PERCENT HR: 103 %
STRESS POST ESTIMATED WORKLOAD: 4.6 METS
STRESS POST EXERCISE DUR MIN: 3 MIN
STRESS POST EXERCISE DUR SEC: 27 SEC
STRESS POST PEAK BP: NORMAL MMHG
STRESS POST PEAK HR: 136 BPM
STRESS TARGET HR: 132 BPM

## 2023-09-25 PROCEDURE — 78452 HT MUSCLE IMAGE SPECT MULT: CPT

## 2023-09-25 PROCEDURE — 0 TECHNETIUM SESTAMIBI: Performed by: INTERNAL MEDICINE

## 2023-09-25 PROCEDURE — 78452 HT MUSCLE IMAGE SPECT MULT: CPT | Performed by: INTERNAL MEDICINE

## 2023-09-25 PROCEDURE — 93016 CV STRESS TEST SUPVJ ONLY: CPT | Performed by: INTERNAL MEDICINE

## 2023-09-25 PROCEDURE — A9500 TC99M SESTAMIBI: HCPCS | Performed by: INTERNAL MEDICINE

## 2023-09-25 PROCEDURE — 93017 CV STRESS TEST TRACING ONLY: CPT

## 2023-09-25 PROCEDURE — 93018 CV STRESS TEST I&R ONLY: CPT | Performed by: INTERNAL MEDICINE

## 2023-09-25 RX ADMIN — TECHNETIUM TC 99M SESTAMIBI 1 DOSE: 1 INJECTION INTRAVENOUS at 09:01

## 2023-09-25 RX ADMIN — TECHNETIUM TC 99M SESTAMIBI 1 DOSE: 1 INJECTION INTRAVENOUS at 07:33

## 2023-09-26 ENCOUNTER — TELEPHONE (OUTPATIENT)
Dept: CARDIOLOGY | Facility: CLINIC | Age: 63
End: 2023-09-26
Payer: MEDICARE

## 2023-09-26 NOTE — TELEPHONE ENCOUNTER
Contacted pt per Dr. Rey Exercise nuclear stress test appeared low risk for myocardial ischemia.    Voiced understanding.         ----- Message from Frantz Rey MD sent at 9/26/2023  9:34 AM CDT -----  Exercise nuclear stress test appeared low risk for myocardial ischemia.

## (undated) DEVICE — GW TUNGSTEN NITINL .018 45CM

## (undated) DEVICE — CATH EP STEER LIVEWIRE DECAPOLAR MD SWEEP 5F 2-5-2MM 115CM

## (undated) DEVICE — INTRO SHEATH ENGAGE W/50 GW .038 8F12

## (undated) DEVICE — CATH EP SUPREME QUADPOLAR JSN 4F 5-5-5M 120CM

## (undated) DEVICE — INTRO HEMO FAST CATH TRIO LK .038 10F 12CM

## (undated) DEVICE — ELECTRD ECD ENSITE VELOCITY NAVX PTCH

## (undated) DEVICE — KT INTRO MINISTICK MAX W/GW NITNL/TUNG ECHO 4F 21G 7CM

## (undated) DEVICE — ST CVR PROB PULLUP ULTRASND 5X48IN

## (undated) DEVICE — INTRO SHEATH ULTIMUM ACT 5F

## (undated) DEVICE — CATH ABL LIVEWIRE TC QUAD BI DIR MD SWEEP 7F 4MM 115CM

## (undated) DEVICE — ELECTRODE,RT,STRESS,FOAM,50PK: Brand: MEDLINE

## (undated) DEVICE — INTRO SHEATH FASTCATH SRO .038IN 8.5F 63CM

## (undated) DEVICE — PERCLOSE PROGLIDE™ SUTURE-MEDIATED CLOSURE SYSTEM: Brand: PERCLOSE PROGLIDE™